# Patient Record
Sex: MALE | Race: WHITE | NOT HISPANIC OR LATINO | Employment: FULL TIME | URBAN - METROPOLITAN AREA
[De-identification: names, ages, dates, MRNs, and addresses within clinical notes are randomized per-mention and may not be internally consistent; named-entity substitution may affect disease eponyms.]

---

## 2021-08-09 PROCEDURE — 99283 EMERGENCY DEPT VISIT LOW MDM: CPT

## 2021-08-10 ENCOUNTER — APPOINTMENT (OUTPATIENT)
Dept: GENERAL RADIOLOGY | Facility: HOSPITAL | Age: 31
End: 2021-08-10

## 2021-08-10 ENCOUNTER — HOSPITAL ENCOUNTER (EMERGENCY)
Facility: HOSPITAL | Age: 31
Discharge: HOME | End: 2021-08-10
Admitting: EMERGENCY MEDICINE

## 2021-08-10 VITALS
SYSTOLIC BLOOD PRESSURE: 146 MMHG | TEMPERATURE: 98.6 F | WEIGHT: 170 LBS | DIASTOLIC BLOOD PRESSURE: 78 MMHG | OXYGEN SATURATION: 98 % | HEIGHT: 65 IN | RESPIRATION RATE: 18 BRPM | BODY MASS INDEX: 28.32 KG/M2 | HEART RATE: 88 BPM

## 2021-08-10 DIAGNOSIS — R13.19 OTHER DYSPHAGIA: Primary | ICD-10-CM

## 2021-08-10 PROCEDURE — 71045 X-RAY EXAM CHEST 1 VIEW: CPT

## 2021-08-10 RX ADMIN — LIDOCAINE HYDROCHLORIDE: 20 SOLUTION ORAL; TOPICAL at 02:01

## 2021-08-10 NOTE — DISCHARGE INSTRUCTIONS
Please take Tylenol and ibuprofen as needed for pain control.  Please call GI doctor, Dr. Gordon, above to schedule appointment later this week for possible need for scope procedure.  Please come back to the ER if you are not able to swallow liquids by mouth or spike a high fever or have significant pain.

## 2021-08-10 NOTE — ED PROVIDER NOTES
Subjective     Patient is a 31-year-old male comes in complaining of foreign body sensation and pain with swallowing for the past few days.  Patient states that he had a remote history of may be swallowing a piece of grass or rock while he was mowing the lawn but states at that time the sensation subsided.  Patient states he has been able to eat or drink since he first noticed this sensation.  Patient states it somewhat hurts to swallow but is able to do so appropriately.  Patient is able to tolerate his own secretions and is not drooling at this time.  Patient denies any fever, chills, nausea, vomiting, abdominal pain, diarrhea, chest pain or burning sensation in the chest.  Patient states his pain is about a 1 out of 10 and is minimal at best.  Patient states his sensation is about the base of his neck and states he is worried about airway compromise.  Patient denies any trouble breathing, shortness of breath or stridorous respirations.        Review of Systems   Constitutional: Negative for chills, fatigue and fever.   HENT: Positive for sore throat and trouble swallowing. Negative for congestion, dental problem, drooling, facial swelling, mouth sores, postnasal drip, rhinorrhea, sinus pressure and tinnitus.    Eyes: Negative for photophobia, discharge and visual disturbance.   Respiratory: Negative for cough and shortness of breath.    Cardiovascular: Negative for chest pain and leg swelling.   Gastrointestinal: Negative for abdominal pain, diarrhea, nausea and vomiting.   Genitourinary: Negative for dysuria, flank pain and urgency.   Musculoskeletal: Negative for arthralgias and myalgias.   Skin: Negative for rash.   Neurological: Negative for dizziness and headaches.   Psychiatric/Behavioral: Negative for confusion.       History reviewed. No pertinent past medical history.    No Known Allergies    History reviewed. No pertinent surgical history.    History reviewed. No pertinent family history.    Social  History     Socioeconomic History   • Marital status: Single     Spouse name: Not on file   • Number of children: Not on file   • Years of education: Not on file   • Highest education level: Not on file           Objective   Physical Exam  Vitals and nursing note reviewed.   Constitutional:       General: He is not in acute distress.     Appearance: He is well-developed. He is not diaphoretic.   HENT:      Head: Normocephalic and atraumatic.      Right Ear: Ear canal and external ear normal.      Left Ear: Ear canal and external ear normal.      Nose: Nose normal.      Mouth/Throat:      Mouth: Mucous membranes are moist.      Pharynx: No oropharyngeal exudate or posterior oropharyngeal erythema.      Comments: No apparent erythema of the mouth, oropharynx or exudate present.  Lymph nodes nontender.  Patient not drooling and able to swallow without issue.  Patient able to tolerate p.o. liquids appropriately.  Eyes:      Extraocular Movements: Extraocular movements intact.      Conjunctiva/sclera: Conjunctivae normal.      Pupils: Pupils are equal, round, and reactive to light.   Cardiovascular:      Rate and Rhythm: Normal rate and regular rhythm.      Heart sounds: Normal heart sounds.      Comments: S1, S2 audible.  Pulmonary:      Effort: Pulmonary effort is normal. No respiratory distress.      Breath sounds: Normal breath sounds. No wheezing, rhonchi or rales.      Comments: On room air.  Abdominal:      General: Bowel sounds are normal. There is no distension.      Palpations: Abdomen is soft.      Tenderness: There is no abdominal tenderness.   Musculoskeletal:         General: No tenderness or deformity. Normal range of motion.      Cervical back: Normal range of motion.   Skin:     General: Skin is warm.      Capillary Refill: Capillary refill takes less than 2 seconds.      Findings: No erythema or rash.   Neurological:      Mental Status: He is alert and oriented to person, place, and time.      Cranial  "Nerves: No cranial nerve deficit.   Psychiatric:         Mood and Affect: Mood normal.         Behavior: Behavior normal.         Procedures           ED Course      /78   Pulse 88   Temp 98.6 °F (37 °C) (Oral)   Resp 18   Ht 165.1 cm (65\")   Wt 77.1 kg (170 lb)   SpO2 98%   BMI 28.29 kg/m²   Labs Reviewed - No data to display  No radiology results for the last day                                       MDM  Number of Diagnoses or Management Options  Risk of Complications, Morbidity, and/or Mortality  Presenting problems: low  Diagnostic procedures: low  Management options: low    Patient Progress  Patient progress: stable    Chart review:   EKG:    Imaging: Chest x-ray unremarkable for foreign body or any acute findings.  Labs:    Vitals:  /78   Pulse 88   Temp 98.6 °F (37 °C) (Oral)   Resp 18   Ht 165.1 cm (65\")   Wt 77.1 kg (170 lb)   SpO2 98%   BMI 28.29 kg/m²     Medications given:    Medications   GI cocktail ( Oral Given 8/10/21 0201)       Procedures:    MDM: Patient is a 31-year-old male comes in complaining of difficulty swallowing and apparent foreign body sensation.  Chest x-ray was obtained and shows no foreign body or any acute findings at this time.  GI cocktail was given and patient states the foreign body sensation is about the same.  Patient was instructed to follow-up with GI, Dr. Gordon, later this week for further evaluation.  Patient is able to tolerate p.o. challenge without issue here in the ER today.  Patient is also afebrile and heart rate in the 80s and breathing appropriately.  Patient not in any acute distress upon discharge. See full discharge instructions for further details.  Results and plan discussed with patient and is agreeable with plan.    Final diagnoses:   Other dysphagia       ED Disposition  ED Disposition     ED Disposition Condition Comment    Discharge Stable           [facility] EMERGENCY DEPARTMENT  [address] " [id number] [phone]  Go in 1 day  If symptoms worsen    PATIENT CONNECTION - [facility] [address]  [phone]  Call in 1 week  As needed    Luis Gordon MD  [address] [place] [address] [phone]    Schedule an appointment as soon as possible for a visit in 3 days           Medication List      No changes were made to your prescriptions during this visit.          Hugo Moffett PA  08/10/21 0300

## 2021-09-27 ENCOUNTER — OFFICE (AMBULATORY)
Dept: URBAN - METROPOLITAN AREA CLINIC 64 | Facility: CLINIC | Age: 31
End: 2021-09-27

## 2021-09-27 VITALS
HEART RATE: 71 BPM | SYSTOLIC BLOOD PRESSURE: 115 MMHG | DIASTOLIC BLOOD PRESSURE: 75 MMHG | WEIGHT: 168 LBS | HEIGHT: 66 IN

## 2021-09-27 DIAGNOSIS — R13.10 DYSPHAGIA, UNSPECIFIED: ICD-10-CM

## 2021-09-27 PROCEDURE — 99203 OFFICE O/P NEW LOW 30 MIN: CPT | Performed by: INTERNAL MEDICINE

## 2021-10-18 ENCOUNTER — ON CAMPUS - OUTPATIENT (AMBULATORY)
Dept: URBAN - METROPOLITAN AREA HOSPITAL 2 | Facility: HOSPITAL | Age: 31
End: 2021-10-18

## 2021-10-18 ENCOUNTER — OFFICE (AMBULATORY)
Dept: URBAN - METROPOLITAN AREA PATHOLOGY 4 | Facility: PATHOLOGY | Age: 31
End: 2021-10-18

## 2021-10-18 VITALS
DIASTOLIC BLOOD PRESSURE: 69 MMHG | HEART RATE: 78 BPM | DIASTOLIC BLOOD PRESSURE: 78 MMHG | RESPIRATION RATE: 16 BRPM | HEART RATE: 87 BPM | HEART RATE: 79 BPM | RESPIRATION RATE: 15 BRPM | HEART RATE: 81 BPM | HEART RATE: 75 BPM | OXYGEN SATURATION: 100 % | SYSTOLIC BLOOD PRESSURE: 114 MMHG | HEART RATE: 85 BPM | DIASTOLIC BLOOD PRESSURE: 71 MMHG | DIASTOLIC BLOOD PRESSURE: 50 MMHG | HEIGHT: 66 IN | SYSTOLIC BLOOD PRESSURE: 125 MMHG | SYSTOLIC BLOOD PRESSURE: 121 MMHG | WEIGHT: 168 LBS | SYSTOLIC BLOOD PRESSURE: 134 MMHG | SYSTOLIC BLOOD PRESSURE: 116 MMHG | RESPIRATION RATE: 18 BRPM | DIASTOLIC BLOOD PRESSURE: 75 MMHG | SYSTOLIC BLOOD PRESSURE: 137 MMHG | DIASTOLIC BLOOD PRESSURE: 70 MMHG | TEMPERATURE: 97.9 F | OXYGEN SATURATION: 99 %

## 2021-10-18 DIAGNOSIS — R13.10 DYSPHAGIA, UNSPECIFIED: ICD-10-CM

## 2021-10-18 DIAGNOSIS — D49.0 NEOPLASM OF UNSPECIFIED BEHAVIOR OF DIGESTIVE SYSTEM: ICD-10-CM

## 2021-10-18 DIAGNOSIS — K22.70 BARRETT'S ESOPHAGUS WITHOUT DYSPLASIA: ICD-10-CM

## 2021-10-18 PROBLEM — K22.9 DISEASE OF ESOPHAGUS, UNSPECIFIED: Status: ACTIVE | Noted: 2021-10-18

## 2021-10-18 LAB
GI HISTOLOGY: A. UNSPECIFIED: (no result)
GI HISTOLOGY: PDF REPORT: (no result)

## 2021-10-18 PROCEDURE — 88305 TISSUE EXAM BY PATHOLOGIST: CPT | Performed by: INTERNAL MEDICINE

## 2021-10-18 PROCEDURE — 43450 DILATE ESOPHAGUS 1/MULT PASS: CPT | Performed by: INTERNAL MEDICINE

## 2021-10-18 PROCEDURE — 43239 EGD BIOPSY SINGLE/MULTIPLE: CPT | Performed by: INTERNAL MEDICINE

## 2021-10-18 RX ORDER — PANTOPRAZOLE 20 MG/1
20 TABLET, DELAYED RELEASE ORAL
Qty: 90 | Refills: 3 | Status: ACTIVE
Start: 2021-10-18

## 2022-10-31 ENCOUNTER — OFFICE (AMBULATORY)
Dept: URBAN - METROPOLITAN AREA CLINIC 64 | Facility: CLINIC | Age: 32
End: 2022-10-31

## 2022-10-31 VITALS
HEART RATE: 71 BPM | DIASTOLIC BLOOD PRESSURE: 82 MMHG | SYSTOLIC BLOOD PRESSURE: 131 MMHG | WEIGHT: 172 LBS | HEIGHT: 66 IN

## 2022-10-31 DIAGNOSIS — K22.70 BARRETT'S ESOPHAGUS WITHOUT DYSPLASIA: ICD-10-CM

## 2022-10-31 PROCEDURE — 99213 OFFICE O/P EST LOW 20 MIN: CPT

## 2022-10-31 RX ORDER — PANTOPRAZOLE 20 MG/1
20 TABLET, DELAYED RELEASE ORAL
Qty: 90 | Refills: 3 | Status: ACTIVE
Start: 2021-10-18

## 2022-12-14 ENCOUNTER — HOSPITAL ENCOUNTER (EMERGENCY)
Facility: HOSPITAL | Age: 32
Discharge: HOME | End: 2022-12-15
Attending: EMERGENCY MEDICINE | Admitting: EMERGENCY MEDICINE

## 2022-12-14 VITALS
SYSTOLIC BLOOD PRESSURE: 119 MMHG | OXYGEN SATURATION: 98 % | HEIGHT: 66 IN | BODY MASS INDEX: 27.16 KG/M2 | HEART RATE: 70 BPM | RESPIRATION RATE: 18 BRPM | TEMPERATURE: 97.9 F | DIASTOLIC BLOOD PRESSURE: 80 MMHG | WEIGHT: 169 LBS

## 2022-12-14 DIAGNOSIS — R53.1 GENERAL WEAKNESS: Primary | ICD-10-CM

## 2022-12-14 LAB
ANION GAP SERPL CALCULATED.3IONS-SCNC: 11 MMOL/L (ref 5–15)
BASOPHILS # BLD AUTO: 0.1 10*3/MM3 (ref 0–0.2)
BASOPHILS NFR BLD AUTO: 0.9 % (ref 0–1.5)
BUN SERPL-MCNC: 13 MG/DL (ref 6–20)
BUN/CREAT SERPL: 16 (ref 7–25)
CALCIUM SPEC-SCNC: 9.1 MG/DL (ref 8.6–10.5)
CHLORIDE SERPL-SCNC: 102 MMOL/L (ref 98–107)
CO2 SERPL-SCNC: 26 MMOL/L (ref 22–29)
CREAT SERPL-MCNC: 0.81 MG/DL (ref 0.76–1.27)
DEPRECATED RDW RBC AUTO: 41.1 FL (ref 37–54)
EGFRCR SERPLBLD CKD-EPI 2021: 118.7 ML/MIN/1.73
EOSINOPHIL # BLD AUTO: 0 10*3/MM3 (ref 0–0.4)
EOSINOPHIL NFR BLD AUTO: 0.5 % (ref 0.3–6.2)
ERYTHROCYTE [DISTWIDTH] IN BLOOD BY AUTOMATED COUNT: 13 % (ref 12.3–15.4)
FLUAV SUBTYP SPEC NAA+PROBE: NOT DETECTED
FLUBV RNA ISLT QL NAA+PROBE: NOT DETECTED
GLUCOSE SERPL-MCNC: 96 MG/DL (ref 65–99)
HCT VFR BLD AUTO: 43.7 % (ref 37.5–51)
HGB BLD-MCNC: 15.2 G/DL (ref 13–17.7)
LYMPHOCYTES # BLD AUTO: 2.7 10*3/MM3 (ref 0.7–3.1)
LYMPHOCYTES NFR BLD AUTO: 29.4 % (ref 19.6–45.3)
MCH RBC QN AUTO: 30.2 PG (ref 26.6–33)
MCHC RBC AUTO-ENTMCNC: 34.6 G/DL (ref 31.5–35.7)
MCV RBC AUTO: 87.1 FL (ref 79–97)
MONOCYTES # BLD AUTO: 0.6 10*3/MM3 (ref 0.1–0.9)
MONOCYTES NFR BLD AUTO: 6.9 % (ref 5–12)
NEUTROPHILS NFR BLD AUTO: 5.8 10*3/MM3 (ref 1.7–7)
NEUTROPHILS NFR BLD AUTO: 62.3 % (ref 42.7–76)
NRBC BLD AUTO-RTO: 0 /100 WBC (ref 0–0.2)
PLATELET # BLD AUTO: 305 10*3/MM3 (ref 140–450)
PMV BLD AUTO: 7.3 FL (ref 6–12)
POTASSIUM SERPL-SCNC: 4.1 MMOL/L (ref 3.5–5.2)
RBC # BLD AUTO: 5.02 10*6/MM3 (ref 4.14–5.8)
SARS-COV-2 RNA PNL SPEC NAA+PROBE: NOT DETECTED
SODIUM SERPL-SCNC: 139 MMOL/L (ref 136–145)
TROPONIN T SERPL-MCNC: <0.01 NG/ML (ref 0–0.03)
WBC NRBC COR # BLD: 9.3 10*3/MM3 (ref 3.4–10.8)

## 2022-12-14 PROCEDURE — 93005 ELECTROCARDIOGRAM TRACING: CPT

## 2022-12-14 PROCEDURE — 85025 COMPLETE CBC W/AUTO DIFF WBC: CPT | Performed by: EMERGENCY MEDICINE

## 2022-12-14 PROCEDURE — 87502 INFLUENZA DNA AMP PROBE: CPT | Performed by: NURSE PRACTITIONER

## 2022-12-14 PROCEDURE — C9803 HOPD COVID-19 SPEC COLLECT: HCPCS

## 2022-12-14 PROCEDURE — 80048 BASIC METABOLIC PNL TOTAL CA: CPT | Performed by: EMERGENCY MEDICINE

## 2022-12-14 PROCEDURE — 93005 ELECTROCARDIOGRAM TRACING: CPT | Performed by: EMERGENCY MEDICINE

## 2022-12-14 PROCEDURE — 87635 SARS-COV-2 COVID-19 AMP PRB: CPT | Performed by: NURSE PRACTITIONER

## 2022-12-14 PROCEDURE — 84484 ASSAY OF TROPONIN QUANT: CPT | Performed by: EMERGENCY MEDICINE

## 2022-12-14 PROCEDURE — 99284 EMERGENCY DEPT VISIT MOD MDM: CPT

## 2022-12-14 RX ORDER — IBUPROFEN 400 MG/1
800 TABLET ORAL ONCE
Status: COMPLETED | OUTPATIENT
Start: 2022-12-14 | End: 2022-12-14

## 2022-12-14 RX ADMIN — IBUPROFEN 800 MG: 400 TABLET, FILM COATED ORAL at 17:22

## 2022-12-14 NOTE — ED NOTES
Pt c/o weakness, pain in different areas of the body.  Pt was seen at the [facility] and told to come to ED.

## 2022-12-14 NOTE — ED PROVIDER NOTES
"Subjective      Provider in Triage Note  32-year-old male presents with generalized weakness and myalgias.  Denies fever sweats chills.  Denies known ill exposure.  He has had Pfizer series to completion 1 booster.  Reports he had his flu shot in October.          Review of Systems   Constitutional: Negative for fever.   HENT: Negative for congestion.    Eyes: Negative for redness.   Respiratory: Negative for cough and shortness of breath.    Cardiovascular: Negative for chest pain.   Gastrointestinal: Negative for abdominal pain, diarrhea and vomiting.   Genitourinary: Negative for dysuria.   Musculoskeletal: Positive for myalgias.   Skin: Negative for rash.   Neurological: Positive for weakness and light-headedness. Negative for headaches.   Psychiatric/Behavioral: Negative for confusion.       No past medical history on file.    No Known Allergies    No past surgical history on file.    No family history on file.    Social History     Socioeconomic History   • Marital status: Single       /77   Pulse 68   Temp 97.9 °F (36.6 °C) (Oral)   Resp 18   Ht 167.6 cm (66\")   Wt 76.7 kg (169 lb)   SpO2 98%   BMI 27.28 kg/m²       Objective   Physical Exam  Vitals and nursing note reviewed.   Constitutional:       Appearance: Normal appearance. He is well-developed.   HENT:      Head: Normocephalic and atraumatic.   Eyes:      Pupils: Pupils are equal, round, and reactive to light.   Cardiovascular:      Rate and Rhythm: Normal rate and regular rhythm.      Heart sounds: Normal heart sounds.   Pulmonary:      Effort: Pulmonary effort is normal. No respiratory distress.      Breath sounds: Normal breath sounds.   Abdominal:      General: Bowel sounds are normal.      Palpations: Abdomen is soft.      Tenderness: There is no abdominal tenderness.   Skin:     General: Skin is warm and dry.   Neurological:      General: No focal deficit present.      Mental Status: He is alert and oriented to person, place, and " time.         Procedures           ED Course      My interpretation of EKG shows sinus rhythm, rate of 89, no ST elevation     Results for orders placed or performed during the hospital encounter of 12/14/22   COVID-19,CEPHEID/RAVI,COR/BOSSMAN/PAD/SVEN/MAD IN-HOUSE(OR EMERGENT/ADD-ON),NP SWAB IN TRANSPORT MEDIA 3-4 HR TAT, RT-PCR - Swab, Nasopharynx    Specimen: Nasopharynx; Swab   Result Value Ref Range    COVID19 Not Detected Not Detected - Ref. Range   Influenza A & B, MARY - Swab, Nasopharynx    Specimen: Nasopharynx; Swab   Result Value Ref Range    Influenza A PCR Not Detected Not Detected    Influenza B PCR Not Detected Not Detected   Basic Metabolic Panel    Specimen: Blood   Result Value Ref Range    Glucose 96 65 - 99 mg/dL    BUN 13 6 - 20 mg/dL    Creatinine 0.81 0.76 - 1.27 mg/dL    Sodium 139 136 - 145 mmol/L    Potassium 4.1 3.5 - 5.2 mmol/L    Chloride 102 98 - 107 mmol/L    CO2 26.0 22.0 - 29.0 mmol/L    Calcium 9.1 8.6 - 10.5 mg/dL    BUN/Creatinine Ratio 16.0 7.0 - 25.0    Anion Gap 11.0 5.0 - 15.0 mmol/L    eGFR 118.7 >60.0 mL/min/1.73   Troponin    Specimen: Blood   Result Value Ref Range    Troponin T <0.010 0.000 - 0.030 ng/mL   CBC Auto Differential    Specimen: Blood   Result Value Ref Range    WBC 9.30 3.40 - 10.80 10*3/mm3    RBC 5.02 4.14 - 5.80 10*6/mm3    Hemoglobin 15.2 13.0 - 17.7 g/dL    Hematocrit 43.7 37.5 - 51.0 %    MCV 87.1 79.0 - 97.0 fL    MCH 30.2 26.6 - 33.0 pg    MCHC 34.6 31.5 - 35.7 g/dL    RDW 13.0 12.3 - 15.4 %    RDW-SD 41.1 37.0 - 54.0 fl    MPV 7.3 6.0 - 12.0 fL    Platelets 305 140 - 450 10*3/mm3    Neutrophil % 62.3 42.7 - 76.0 %    Lymphocyte % 29.4 19.6 - 45.3 %    Monocyte % 6.9 5.0 - 12.0 %    Eosinophil % 0.5 0.3 - 6.2 %    Basophil % 0.9 0.0 - 1.5 %    Neutrophils, Absolute 5.80 1.70 - 7.00 10*3/mm3    Lymphocytes, Absolute 2.70 0.70 - 3.10 10*3/mm3    Monocytes, Absolute 0.60 0.10 - 0.90 10*3/mm3    Eosinophils, Absolute 0.00 0.00 - 0.40 10*3/mm3    Basophils,  Absolute 0.10 0.00 - 0.20 10*3/mm3    nRBC 0.0 0.0 - 0.2 /100 WBC   ECG 12 Lead Chest Pain   Result Value Ref Range    QT Interval 349 ms                                       MDM  Patient had the above evaluation.  Results were discussed with the patient.  EKG shows no acute ischemia.  Troponin is negative.  White blood cell count is normal.  BMP is unremarkable.  Flu and COVID screens are negative.  Patient is well-appearing on exam.  He will be discharged and will follow-up with his primary doctor.      Final diagnoses:   General weakness       ED Disposition  ED Disposition     ED Disposition   Discharge    Condition   Stable    Comment   --             No follow-up provider specified.       Medication List      No changes were made to your prescriptions during this visit.          Theron Sampson MD  12/14/22 4871

## 2022-12-15 ENCOUNTER — OFFICE VISIT (OUTPATIENT)
Dept: INTERNAL MEDICINE | Facility: CLINIC | Age: 32
End: 2022-12-15

## 2022-12-15 VITALS
SYSTOLIC BLOOD PRESSURE: 124 MMHG | TEMPERATURE: 98.2 F | WEIGHT: 166.4 LBS | BODY MASS INDEX: 26.74 KG/M2 | DIASTOLIC BLOOD PRESSURE: 68 MMHG | OXYGEN SATURATION: 98 % | HEIGHT: 66 IN | HEART RATE: 72 BPM

## 2022-12-15 DIAGNOSIS — R29.898 WEAKNESS OF BOTH LEGS: Primary | ICD-10-CM

## 2022-12-15 DIAGNOSIS — Z11.59 ENCOUNTER FOR HEPATITIS C SCREENING TEST FOR LOW RISK PATIENT: ICD-10-CM

## 2022-12-15 DIAGNOSIS — R74.01 ELEVATED ALT MEASUREMENT: ICD-10-CM

## 2022-12-15 PROCEDURE — 99204 OFFICE O/P NEW MOD 45 MIN: CPT | Performed by: STUDENT IN AN ORGANIZED HEALTH CARE EDUCATION/TRAINING PROGRAM

## 2022-12-15 RX ORDER — PANTOPRAZOLE SODIUM 20 MG/1
20 TABLET, DELAYED RELEASE ORAL DAILY
COMMUNITY

## 2022-12-15 NOTE — PROGRESS NOTES
"  Elia Gorman M.D.  Internal Medicine  [facility]  [address], [address]  [place] [address]  [phone]      Chief Complaint  Establish Care and Hospital Follow Up Visit (Went to [facility] Sat a week ago for weakness. Then again last night to ER, again for weakness. They stated his BP was elevated. Needs Dr. Delaney for today. /)    SUBJECTIVE    History of Present Illness    Girish Herzog is a 32 y.o. male who presents to the office today as a new patient to establish care.     Symptoms started Saturday night. He went to [facility] on Sunday for weak knees and tingly on the left legs for a few minutes.     Yesterday he felt he had to \"take a few deep breathes\" and he felt \"weak in the knees.\" He felt like he was going to fall. He felt a mild pain all over. Pain felt like tapping in different parts of the body. He presented to the emergency department   He denies fevers, sweats, chills.  No known sick contacts.  He had EKG without acute ischemic changes.  Negative troponin.  No elevation in white blood cell count.  Normal BMP.  Negative flu and COVID. No inciting events.    Dr. Gordon is his gastroenterologist.     Social-works at [employer]. Likes Music, TV and video games.     Review of Systems    No Known Allergies     Outpatient Medications Marked as Taking for the 12/15/22 encounter (Office Visit) with Elia Gorman MD   Medication Sig Dispense Refill   • pantoprazole (PROTONIX) 20 MG EC tablet Take 20 mg by mouth Daily.          Past Medical History:   Diagnosis Date   • GERD (gastroesophageal reflux disease)      Past Surgical History:   Procedure Laterality Date   • ENDOSCOPY       Family History   Problem Relation Age of Onset   • Diabetes Father     reports that he has never smoked. He does not have any smokeless tobacco history on file. He reports that he does not currently use alcohol. He reports that he does not use drugs.    OBJECTIVE    Vital Signs:   /68   Pulse " "72   Temp 98.2 °F (36.8 °C)   Ht 167.6 cm (65.98\")   Wt 75.5 kg (166 lb 6.4 oz)   SpO2 98%   BMI 26.87 kg/m²     Physical Exam  Constitutional:       Appearance: Normal appearance. He is normal weight.   Cardiovascular:      Rate and Rhythm: Normal rate and regular rhythm.      Heart sounds: Normal heart sounds. No murmur heard.  Pulmonary:      Effort: Pulmonary effort is normal.      Breath sounds: Normal breath sounds.   Abdominal:      General: Abdomen is flat. There is no distension.      Palpations: Abdomen is soft.      Tenderness: There is no abdominal tenderness.   Skin:     General: Skin is warm and dry.   Neurological:      General: No focal deficit present.      Mental Status: He is alert.      Cranial Nerves: No cranial nerve deficit.      Sensory: No sensory deficit.      Motor: No weakness or tremor.      Coordination: Coordination normal.      Deep Tendon Reflexes: Reflexes normal.   Psychiatric:         Mood and Affect: Mood normal.         Behavior: Behavior normal.         Thought Content: Thought content normal.            The following data was reviewed by: Elia Gorman MD on 12/15/2022:  CMP    CMP 12/14/22   Glucose 96   BUN 13   Creatinine 0.81   Sodium 139   Potassium 4.1   Chloride 102   Calcium 9.1           CBC w/diff    CBC w/Diff 12/14/22   WBC 9.30   RBC 5.02   Hemoglobin 15.2   Hematocrit 43.7   MCV 87.1   MCH 30.2   MCHC 34.6   RDW 13.0   Platelets 305   Neutrophil Rel % 62.3   Lymphocyte Rel % 29.4   Monocyte Rel % 6.9   Eosinophil Rel % 0.5   Basophil Rel % 0.9                       Data reviewed: Recent emergency department and urgent care note from the [facility].         ALT was 48 and AST was 25, Bili 0.6 and AP was 78 at [facility].    ASSESSMENT & PLAN     Diagnoses and all orders for this visit:    1. Weakness of both legs (Primary)  -     MRI Brain With & Without Contrast; Future  -     Ambulatory Referral to Neurology    2. Encounter for hepatitis C screening " test for low risk patient  -     HCV Antibody Rfx To Qnt PCR; Future    3. Elevated ALT measurement  -     Comprehensive Metabolic Panel; Future      Patient with emergency department visit yesterday for transient leg weakness  with generalized pain.  Patient had normal emergency department work-up.  I am not sure what this episode was.  His examination is normal today.  Would like to work-up neurologic causes with imaging and will refer to neurology for this.  Patient had very mildly elevated [facility].  We will recheck that.    He follows with GI for GERD.    Health Maintenance Due   Topic Date Due   • COVID-19 Vaccine (4 - Booster for Pfizer series) 01/30/2022   • HEPATITIS C SCREENING  Never done        Follow Up  Return in about 1 year (around 12/15/2023) for Annual physical.    Patient/family had no further questions at this time and verbalized understanding of the plan discussed today.

## 2022-12-16 ENCOUNTER — TELEPHONE (OUTPATIENT)
Dept: INTERNAL MEDICINE | Facility: CLINIC | Age: 32
End: 2022-12-16

## 2022-12-16 LAB — QT INTERVAL: 349 MS

## 2022-12-16 NOTE — TELEPHONE ENCOUNTER
Pt calling stating that last night he was experiencing tingling in both arms while just walking around-pt states there were no other symptoms-tingling began around 11pm and only lasted about an hour but he is concerned- Please advise        Best call back number is [phone]

## 2022-12-16 NOTE — TELEPHONE ENCOUNTER
I called Girish Herzog at [phone] at 14:20 EST and he did not  phone.    I called patient back and left message that if he cannot feel his arms for prolonged periods or cannot move arms or having other new neurologic symptoms these are reasons to go to emergency department.  If they his arms are just tingling and feeling funny and this goes away on its own then this is something that we can monitor.  I left our office number in case he has questions.

## 2023-01-18 ENCOUNTER — LAB (OUTPATIENT)
Dept: INTERNAL MEDICINE | Facility: CLINIC | Age: 33
End: 2023-01-18
Payer: COMMERCIAL

## 2023-01-18 DIAGNOSIS — R74.01 ELEVATED ALT MEASUREMENT: ICD-10-CM

## 2023-01-18 DIAGNOSIS — R74.01 ELEVATED ALT MEASUREMENT: Primary | ICD-10-CM

## 2023-01-18 LAB
ALBUMIN SERPL-MCNC: 4.8 G/DL (ref 3.5–5.2)
ALBUMIN/GLOB SERPL: 2.5 G/DL
ALP SERPL-CCNC: 73 U/L (ref 39–117)
ALT SERPL-CCNC: 44 U/L (ref 1–41)
AST SERPL-CCNC: 19 U/L (ref 1–40)
BILIRUB SERPL-MCNC: 0.7 MG/DL (ref 0–1.2)
BUN SERPL-MCNC: 9 MG/DL (ref 6–20)
BUN/CREAT SERPL: 11 (ref 7–25)
CALCIUM SERPL-MCNC: 9.9 MG/DL (ref 8.6–10.5)
CHLORIDE SERPL-SCNC: 105 MMOL/L (ref 98–107)
CO2 SERPL-SCNC: 28.4 MMOL/L (ref 22–29)
CREAT SERPL-MCNC: 0.82 MG/DL (ref 0.76–1.27)
EGFRCR SERPLBLD CKD-EPI 2021: 118.2 ML/MIN/1.73
GLOBULIN SER CALC-MCNC: 1.9 GM/DL
GLUCOSE SERPL-MCNC: 108 MG/DL (ref 65–99)
POTASSIUM SERPL-SCNC: 4.2 MMOL/L (ref 3.5–5.2)
PROT SERPL-MCNC: 6.7 G/DL (ref 6–8.5)
SODIUM SERPL-SCNC: 142 MMOL/L (ref 136–145)

## 2023-01-25 LAB
FERRITIN SERPL-MCNC: 216 NG/ML (ref 30–400)
HBV SURFACE AG SERPL QL IA: NEGATIVE
HCV AB S/CO SERPL IA: <0.1 S/CO RATIO (ref 0–0.9)
HCV AB SERPL QL IA: NORMAL
IRON SATN MFR SERPL: 43 % (ref 20–50)
IRON SERPL-MCNC: 151 MCG/DL (ref 59–158)
Lab: NORMAL
TIBC SERPL-MCNC: 355 MCG/DL
UIBC SERPL-MCNC: 204 MCG/DL (ref 112–346)
WRITTEN AUTHORIZATION: NORMAL

## 2023-01-28 ENCOUNTER — HOSPITAL ENCOUNTER (OUTPATIENT)
Dept: MRI IMAGING | Facility: HOSPITAL | Age: 33
Discharge: HOME | End: 2023-01-28
Admitting: STUDENT IN AN ORGANIZED HEALTH CARE EDUCATION/TRAINING PROGRAM
Payer: COMMERCIAL

## 2023-01-28 DIAGNOSIS — R29.898 WEAKNESS OF BOTH LEGS: ICD-10-CM

## 2023-01-28 PROCEDURE — A9577 INJ MULTIHANCE: HCPCS | Performed by: STUDENT IN AN ORGANIZED HEALTH CARE EDUCATION/TRAINING PROGRAM

## 2023-01-28 PROCEDURE — 70553 MRI BRAIN STEM W/O & W/DYE: CPT

## 2023-01-28 PROCEDURE — 0 GADOBENATE DIMEGLUMINE 529 MG/ML SOLUTION: Performed by: STUDENT IN AN ORGANIZED HEALTH CARE EDUCATION/TRAINING PROGRAM

## 2023-01-28 RX ADMIN — GADOBENATE DIMEGLUMINE 15 ML: 529 INJECTION, SOLUTION INTRAVENOUS at 10:05

## 2023-01-31 ENCOUNTER — TELEPHONE (OUTPATIENT)
Dept: INTERNAL MEDICINE | Facility: CLINIC | Age: 33
End: 2023-01-31
Payer: COMMERCIAL

## 2023-01-31 DIAGNOSIS — R22.0 SCALP MASS: Primary | ICD-10-CM

## 2023-01-31 NOTE — TELEPHONE ENCOUNTER
I called Girish Herzog at [phone] at 08:43 EST.  There was no answer.    I left a voicemail stating that there was not any obvious cause of his neurologic symptoms on MRI however there was a scalp lesion that could be a hemangioma versus malignant infiltrative lesion.  I told patient that he should get a follow-up MRI in 3 months for stability.

## 2023-02-21 ENCOUNTER — OFFICE VISIT (OUTPATIENT)
Dept: INTERNAL MEDICINE | Facility: CLINIC | Age: 33
End: 2023-02-21
Payer: COMMERCIAL

## 2023-02-21 VITALS
BODY MASS INDEX: 26.41 KG/M2 | OXYGEN SATURATION: 97 % | HEART RATE: 81 BPM | HEIGHT: 66 IN | DIASTOLIC BLOOD PRESSURE: 82 MMHG | SYSTOLIC BLOOD PRESSURE: 122 MMHG | WEIGHT: 164.3 LBS

## 2023-02-21 DIAGNOSIS — R20.2 PARESTHESIAS: Primary | ICD-10-CM

## 2023-02-21 PROCEDURE — 99213 OFFICE O/P EST LOW 20 MIN: CPT | Performed by: STUDENT IN AN ORGANIZED HEALTH CARE EDUCATION/TRAINING PROGRAM

## 2023-02-21 NOTE — PROGRESS NOTES
Elia Gorman M.D.  Internal Medicine  [facility]  [address], [address]  [place] [address]  [phone]      Chief Complaint  Blurred Vision (Vision blurred and foggy he took a deep breath and was fine /) and weak in knees     SUBJECTIVE    History of Present Illness    Girish Herzog is a 32 y.o. male who presents to the office today as an established patient that last saw me on 12/15/2022.     Paresthesias happened twice since last visit. Feels tingling on face. Feels this may be related to stress but can also happen randomly.     The evening of 2/14 patient felt weak in thighs. Eye sight went fuzzy or dim which went away when he took a deep breath. Vision changes only happened for a split second. No pain in chest . Just tingling in fingers, and thighs and feet. Before it happened he had feeling back top teeth. Just came in from outside getting ready to go on break. Lasted less than 30 minutes. He felt weak in calves. Sometimes he gets random pain in left chest and elbow. He notices this happens more in evening or morning. No lightheaded/dizzziness.     Rainey's esophagus-he is getting repeat EGD in 2025. He has not had chest pain since day he went to ED (pain felt like getting poked continuously). He does get reflux.     1.4 x 0.6 cm indeterminate lesion within the left lateral frontal scalp  that involves the left temporalis muscle recommend a follow-up MRI in approximately 3 month interval to ensure stability. He has not noticed any bumps. States he has headaches.     Review of Systems    No Known Allergies     Outpatient Medications Marked as Taking for the 2/21/23 encounter (Office Visit) with Elia Gorman MD   Medication Sig Dispense Refill   • pantoprazole (PROTONIX) 20 MG EC tablet Take 20 mg by mouth Daily.          Past Medical History:   Diagnosis Date   • GERD (gastroesophageal reflux disease)      Past Surgical History:   Procedure Laterality Date   • ENDOSCOPY    "    Family History   Problem Relation Age of Onset   • Diabetes Father     reports that he has never smoked. He does not have any smokeless tobacco history on file. He reports that he does not currently use alcohol. He reports that he does not use drugs.    OBJECTIVE    Vital Signs:   /82   Pulse 81   Ht 167.6 cm (65.98\")   Wt 74.5 kg (164 lb 4.8 oz)   SpO2 97%   BMI 26.53 kg/m²     Physical Exam  Constitutional:       Appearance: Normal appearance. He is normal weight.   HENT:      Head: Normocephalic and atraumatic.   Cardiovascular:      Rate and Rhythm: Normal rate and regular rhythm.      Heart sounds: Normal heart sounds. No murmur heard.  Pulmonary:      Effort: Pulmonary effort is normal.      Breath sounds: Normal breath sounds.   Skin:     General: Skin is warm and dry.   Neurological:      General: No focal deficit present.      Mental Status: He is alert.      Cranial Nerves: No cranial nerve deficit.      Sensory: No sensory deficit.   Psychiatric:         Mood and Affect: Mood normal.         Behavior: Behavior normal.         Thought Content: Thought content normal.     I am unable to palpate any scalp mass.      The following data was reviewed by: Elia Gorman MD on 02/21/2023:  Common labs    Common Labs 12/14/22 12/14/22 1/18/23    2105 2105    Glucose  96 108 (A)   BUN  13 9   Creatinine  0.81 0.82   Sodium  139 142   Potassium  4.1 4.2   Chloride  102 105   Calcium  9.1 9.9   Total Protein   6.7   Albumin   4.8   Total Bilirubin   0.7   Alkaline Phosphatase   73   AST (SGOT)   19   ALT (SGPT)   44 (A)   WBC 9.30     Hemoglobin 15.2     Hematocrit 43.7     Platelets 305     (A) Abnormal value            Data reviewed: Recent labs and MRI             ASSESSMENT & PLAN     Diagnoses and all orders for this visit:    1. Paresthesias (Primary)  -     TSH Rfx On Abnormal To Free T4  -     Vitamin B12      32-year-old with PMH of GERD and Rainey's esophagus here today for episodes of " paresthesias.  MRI was significant for a 1.4 x 0.6 cm lesion in the left frontal scalp.  We will check B12 and TSH hyperparesthesias work-up today.  Neurology consult pending.  If not neurologic in origin this could be panic or vaso-vagal episode.  Encouraged patient to go to the emergency department for strokelike symptoms.      Health Maintenance Due   Topic Date Due   • COVID-19 Vaccine (4 - Booster for Pfizer series) 01/30/2022   • ANNUAL PHYSICAL  Never done        Follow Up  No follow-ups on file.    Patient/family had no further questions at this time and verbalized understanding of the plan discussed today.

## 2023-02-22 LAB
FERRITIN SERPL-MCNC: 234 NG/ML (ref 30–400)
HBV SURFACE AG SERPL QL IA: NEGATIVE
HCV AB SERPL QL IA: NORMAL
HCV IGG SERPL QL IA: NON REACTIVE
IRON SATN MFR SERPL: 30 % (ref 20–50)
IRON SERPL-MCNC: 101 MCG/DL (ref 59–158)
TIBC SERPL-MCNC: 341 MCG/DL
UIBC SERPL-MCNC: 240 MCG/DL (ref 112–346)

## 2023-03-02 ENCOUNTER — TELEPHONE (OUTPATIENT)
Dept: NEUROLOGY | Facility: CLINIC | Age: 33
End: 2023-03-02

## 2023-03-02 ENCOUNTER — OFFICE VISIT (OUTPATIENT)
Dept: NEUROLOGY | Facility: CLINIC | Age: 33
End: 2023-03-02
Payer: COMMERCIAL

## 2023-03-02 ENCOUNTER — LAB (OUTPATIENT)
Dept: LAB | Facility: HOSPITAL | Age: 33
End: 2023-03-02
Payer: COMMERCIAL

## 2023-03-02 VITALS
WEIGHT: 167 LBS | SYSTOLIC BLOOD PRESSURE: 142 MMHG | BODY MASS INDEX: 26.84 KG/M2 | HEIGHT: 66 IN | OXYGEN SATURATION: 98 % | DIASTOLIC BLOOD PRESSURE: 88 MMHG | HEART RATE: 86 BPM

## 2023-03-02 DIAGNOSIS — R40.4 NONSPECIFIC PAROXYSMAL SPELL: Primary | ICD-10-CM

## 2023-03-02 DIAGNOSIS — R40.4 NONSPECIFIC PAROXYSMAL SPELL: ICD-10-CM

## 2023-03-02 LAB
FOLATE SERPL-MCNC: 7.59 NG/ML (ref 4.78–24.2)
TSH SERPL DL<=0.05 MIU/L-ACNC: 0.94 UIU/ML (ref 0.27–4.2)
VIT B12 BLD-MCNC: 333 PG/ML (ref 211–946)

## 2023-03-02 PROCEDURE — 84443 ASSAY THYROID STIM HORMONE: CPT | Performed by: PSYCHIATRY & NEUROLOGY

## 2023-03-02 PROCEDURE — 82607 VITAMIN B-12: CPT

## 2023-03-02 PROCEDURE — 82746 ASSAY OF FOLIC ACID SERUM: CPT

## 2023-03-02 PROCEDURE — 36415 COLL VENOUS BLD VENIPUNCTURE: CPT

## 2023-03-02 PROCEDURE — 99204 OFFICE O/P NEW MOD 45 MIN: CPT | Performed by: PSYCHIATRY & NEUROLOGY

## 2023-03-02 NOTE — ASSESSMENT & PLAN NOTE
32 year old right handed man with spells.  He reports that around 12/10/2022 he was at work outside and he felt the need to take a deep breath and felt like there was a weakness in the knees (he did not think he was actually going to fall but had a sensation like he was week in his knees) which lasted less than 30 minutes.  The second time this spell happened was on 12/14/2022 and the same thing he felt like he needed to take a breath and had random pains in his arms and legs and chest along with the same weakness sensation that lasted around the same time and resolved.  With the second spell he was evaluated in the ED.  He had lab work that looked normal.  He had another episodes on 1/19/2023 which did not last as long.  He had another one on the 14th of February.  He thinks his last spell was on 2/17/2023.  All of the spells are similar to each other and appear stereotypical.  Of note he had a brain MRI scan on 1/28/2023 which I reviewed the images independently on his visit today and does not demonstrate any acute intracranial abnormalities but does demonstrate hyperintensity in the left frontal/temporal musculature for which a repeat brain MRI scan was recommended in 3 months to be sure the findings are stable and he tells me today that he has this scheduled for 4/18/2023.  He does not experience LOC with these spells.  He is aware that they are going on.  He denies history of migraines but tells me he has the occasional headaches that are not severe or debilitating without any light or sound sensitivity or nausea.  He reports a normal birth history and no seizures that he is aware of in childhood.  He is not sure about any family history of seizures.  He has not gotten weak to the point of falling and he tells me today he is not really weak he just feels the sensation of possible weakness.  His examination is normal today.  I will order an EEG to be sure these spells are not focal seizures without impairment of  consciousness as they appear to be stereotypical.  I will also check TSH to be sure he does not have thyroid disease and also check Vit B12/folate as well to be sure this is not contributing to his symptoms.  I will follow up with him following the testing.

## 2023-03-02 NOTE — TELEPHONE ENCOUNTER
----- Message from Nakita Bush MD sent at 3/2/2023 11:09 AM EST -----  Normal low would recommend oral supplementation.

## 2023-03-02 NOTE — PROGRESS NOTES
Chief Complaint  Extremity Weakness (Pt referred by PCP for Weakness in Lower extremities. Pt was at work in December 2022. He said he felt he needed to take a deep breaths and felt very weak in legs and knees. This happened again at work a week later and he experienced pain in arm and chest. Pt was taken to the ER during this episode. )    Subjective          Girish Herzog presents to [facility] NEUROLOGY for   HISTORY OF PRESENT ILLNESS:    Girish Herzog is a 32 year old right handed man who presents to neurology clinic for initial evaluation and treatment of a spells.  He reports that around 12/10/2022 he was at work outside and he felt the need to take a deep breath and felt like there was a weakness in the knees (he did not think he was actually going to fall but had a sensation like he was week in his knees) which lasted less than 30 minutes.  The second time this spell happened was on 12/14/2022 and the same thing he felt like he needed to take a breath and had random pains in his arms and legs and chest along with the same weakness sensation that lasted around the same time and resolved.  With the second spell he was evaluated in the ED.  He had lab work that looked normal.  He had another episodes on 1/19/2023 which did not last as long.  He had another one on the 14th of February.  He thinks his last spell was on 2/17/2023.  All of the spells are similar to each other and appear stereotypical.  Of note he had a brain MRI scan on 1/28/2023 which I reviewed the images independently on his visit today and does not demonstrate any acute intracranial abnormalities but does demonstrate hyperintensity in the left frontal/temporal musculature for which a repeat brain MRI scan was recommended in 3 months to be sure the findings are stable and he tells me today that he has this scheduled for 4/18/2023.  He denies history of migraines but tells me he has the occasional headaches that are not  "severe or debilitating without any light or sound sensitivity or nausea.  He reports a normal birth history and no seizures that he is aware of in childhood.  He is not sure about any family history of seizures.  He has not gotten weak to the point of falling and he tells me today he is not really weak he just feels the sensation of possible weakness.  He has not noticed any association of these spells with eating or not eating.  He denies feeling sleepy during these spells.  He also denies any increased anxiety or stress in his personal life when these spells started.      Past Medical History:   Diagnosis Date   • GERD (gastroesophageal reflux disease)         Family History   Problem Relation Age of Onset   • Diabetes Father         Social History     Socioeconomic History   • Marital status: Single   Tobacco Use   • Smoking status: Never   Substance and Sexual Activity   • Alcohol use: Not Currently     Comment: Social drinker   • Drug use: Never   • Sexual activity: Never        I have reviewed and confirmed the accuracy of the ROS as documented by the MA/LPN/RN Nakita Bush MD    Review of Systems   Eyes: Negative for blurred vision, double vision and photophobia.   Musculoskeletal: Negative for back pain, gait problem and neck pain.   Neurological: Positive for weakness (leg) and numbness (tingling in arm and face). Negative for dizziness, tremors, seizures, syncope, facial asymmetry, speech difficulty, light-headedness, headache, memory problem and confusion.   Psychiatric/Behavioral: Negative for agitation, behavioral problems, decreased concentration, dysphoric mood, hallucinations, self-injury, sleep disturbance, suicidal ideas, negative for hyperactivity, depressed mood and stress. The patient is not nervous/anxious.    (the weakness in the leg and numbness and tingling in arm and face are only during the spells not currently)    Objective   Vital Signs:   /88   Pulse 86   Ht 167.6 cm (65.98\") "   Wt 75.8 kg (167 lb)   SpO2 98%   BMI 26.97 kg/m²       PHYSICAL EXAM:    General   Mental Status - Alert. General Appearance - Well developed, Well groomed, Oriented and Cooperative. Orientation - Oriented X3.       Head and Neck  Head - normocephalic, atraumatic with no lesions or palpable masses.  Neck    Global Assessment - supple.       Eye   Sclera/Conjunctiva - Bilateral - Normal.    ENMT  Mouth and Throat   Oral Cavity/Oropharynx: Oropharynx - the soft palate,uvula and tongue are normal in appearance.    Chest and Lung Exam   Chest - lung clear to auscultation bilaterally.    Cardiovascular   Cardiovascular examination reveals  - normal heart sounds, regular rate and rhythm.    Neurologic   Mental Status: Speech - Normal. Cognitive function - appropriate fund of knowledge. No impairment of attention, Impairment of concentration, impairment of long term memory or impairment of short term memory.  Cranial Nerves:   II Optic: Visual acuity - Left - Normal. Right - Normal. Visual fields - Normal (to confrontation).  III Oculomotor: Pupillary constriction - Left - Normal. Right - Normal.  VII Facial: - Normal Bilaterally.   IX Glossopharyngeal / X Vagus - Normal.  XI Accessory: Trapezius - Bilateral - Normal. Sternocleidomastoid - Bilateral - Normal.  XII Hypoglossal - Bilateral - Normal.  Eye Movements: - Normal Bilaterally.  Sensory:   Light Touch: Intact - Globally.  Motor:   Bulk and Contour: - Normal.  Tone: - Normal.  Tremor: Not present.  Strength: 5/5 normal muscle strength - All Muscles.   General Assessment of Reflexes: - deep tendon reflexes are normal. Coordination - No Impairment of finger-to-nose or Impairment of rapid alternating movements. Gait - Normal.     Result Review :                 Assessment and Plan    Problem List Items Addressed This Visit        Neuro    Nonspecific paroxysmal spell - Primary    Current Assessment & Plan     32 year old right handed man with spells.  He reports  that around 12/10/2022 he was at work outside and he felt the need to take a deep breath and felt like there was a weakness in the knees (he did not think he was actually going to fall but had a sensation like he was week in his knees) which lasted less than 30 minutes.  The second time this spell happened was on 12/14/2022 and the same thing he felt like he needed to take a breath and had random pains in his arms and legs and chest along with the same weakness sensation that lasted around the same time and resolved.  With the second spell he was evaluated in the ED.  He had lab work that looked normal.  He had another episodes on 1/19/2023 which did not last as long.  He had another one on the 14th of February.  He thinks his last spell was on 2/17/2023.  All of the spells are similar to each other and appear stereotypical.  Of note he had a brain MRI scan on 1/28/2023 which I reviewed the images independently on his visit today and does not demonstrate any acute intracranial abnormalities but does demonstrate hyperintensity in the left frontal/temporal musculature for which a repeat brain MRI scan was recommended in 3 months to be sure the findings are stable and he tells me today that he has this scheduled for 4/18/2023.  He does not experience LOC with these spells.  He is aware that they are going on.  He denies history of migraines but tells me he has the occasional headaches that are not severe or debilitating without any light or sound sensitivity or nausea.  He reports a normal birth history and no seizures that he is aware of in childhood.  He is not sure about any family history of seizures.  He has not gotten weak to the point of falling and he tells me today he is not really weak he just feels the sensation of possible weakness.  His examination is normal today.  I will order an EEG to be sure these spells are not focal seizures without impairment of consciousness as they appear to be stereotypical.  I  will also check TSH to be sure he does not have thyroid disease and also check Vit B12/folate as well to be sure this is not contributing to his symptoms.  I will follow up with him following the testing.          Relevant Orders    EEG    TSH Rfx On Abnormal To Free T4    Vitamin B12    Folate     I spent 48 minutes caring for Girish on this date of service. This time includes time spent by me in the following activities:preparing for the visit, reviewing tests, obtaining and/or reviewing a separately obtained history, performing a medically appropriate examination and/or evaluation , counseling and educating the patient/family/caregiver, ordering medications, tests, or procedures, documenting information in the medical record, independently interpreting results and communicating that information with the patient/family/caregiver and care coordination    Follow Up   No follow-ups on file.  Patient was given instructions and counseling regarding his condition or for health maintenance advice. Please see specific information pulled into the AVS if appropriate.

## 2023-03-03 ENCOUNTER — TELEPHONE (OUTPATIENT)
Dept: NEUROLOGY | Facility: CLINIC | Age: 33
End: 2023-03-03
Payer: COMMERCIAL

## 2023-03-10 ENCOUNTER — PATIENT ROUNDING (BHMG ONLY) (OUTPATIENT)
Dept: NEUROLOGY | Facility: CLINIC | Age: 33
End: 2023-03-10
Payer: COMMERCIAL

## 2023-04-18 ENCOUNTER — HOSPITAL ENCOUNTER (OUTPATIENT)
Dept: MRI IMAGING | Facility: HOSPITAL | Age: 33
Discharge: HOME | End: 2023-04-18
Admitting: STUDENT IN AN ORGANIZED HEALTH CARE EDUCATION/TRAINING PROGRAM
Payer: COMMERCIAL

## 2023-04-18 DIAGNOSIS — R22.0 SCALP MASS: ICD-10-CM

## 2023-04-18 PROCEDURE — 70553 MRI BRAIN STEM W/O & W/DYE: CPT

## 2023-04-18 PROCEDURE — A9577 INJ MULTIHANCE: HCPCS | Performed by: STUDENT IN AN ORGANIZED HEALTH CARE EDUCATION/TRAINING PROGRAM

## 2023-04-18 PROCEDURE — 0 GADOBENATE DIMEGLUMINE 529 MG/ML SOLUTION: Performed by: STUDENT IN AN ORGANIZED HEALTH CARE EDUCATION/TRAINING PROGRAM

## 2023-04-18 RX ADMIN — GADOBENATE DIMEGLUMINE 16 ML: 529 INJECTION, SOLUTION INTRAVENOUS at 11:50

## 2023-04-20 DIAGNOSIS — R93.0 ABNORMAL MRI OF HEAD: Primary | ICD-10-CM

## 2023-04-28 ENCOUNTER — HOSPITAL ENCOUNTER (OUTPATIENT)
Dept: SLEEP MEDICINE | Facility: HOSPITAL | Age: 33
Discharge: HOME | End: 2023-04-28
Payer: COMMERCIAL

## 2023-04-28 DIAGNOSIS — R40.4 NONSPECIFIC PAROXYSMAL SPELL: ICD-10-CM

## 2023-04-28 PROCEDURE — 95813 EEG EXTND MNTR 61-119 MIN: CPT | Performed by: PSYCHIATRY & NEUROLOGY

## 2023-04-28 PROCEDURE — 95813 EEG EXTND MNTR 61-119 MIN: CPT

## 2023-07-25 ENCOUNTER — TELEPHONE (OUTPATIENT)
Dept: INTERNAL MEDICINE | Facility: CLINIC | Age: 33
End: 2023-07-25
Payer: COMMERCIAL

## 2023-07-25 NOTE — TELEPHONE ENCOUNTER
----- Message from Ramona Ramirez MD sent at 7/25/2023 12:50 PM EDT -----  Normal lab testing. Patient should f/u w/ Dr. Gorman if having continued leg symtpoms.  jw

## 2023-11-28 ENCOUNTER — HOSPITAL ENCOUNTER (OUTPATIENT)
Dept: MRI IMAGING | Facility: HOSPITAL | Age: 33
Discharge: HOME | End: 2023-11-28
Admitting: STUDENT IN AN ORGANIZED HEALTH CARE EDUCATION/TRAINING PROGRAM
Payer: COMMERCIAL

## 2023-11-28 DIAGNOSIS — R93.0 ABNORMAL MRI OF HEAD: ICD-10-CM

## 2023-11-28 PROCEDURE — 70553 MRI BRAIN STEM W/O & W/DYE: CPT

## 2023-11-28 PROCEDURE — A9577 INJ MULTIHANCE: HCPCS | Performed by: STUDENT IN AN ORGANIZED HEALTH CARE EDUCATION/TRAINING PROGRAM

## 2023-11-28 PROCEDURE — 0 GADOBENATE DIMEGLUMINE 529 MG/ML SOLUTION: Performed by: STUDENT IN AN ORGANIZED HEALTH CARE EDUCATION/TRAINING PROGRAM

## 2023-11-28 RX ADMIN — GADOBENATE DIMEGLUMINE 15 ML: 529 INJECTION, SOLUTION INTRAVENOUS at 14:32

## 2023-11-29 ENCOUNTER — TELEPHONE (OUTPATIENT)
Dept: INTERNAL MEDICINE | Facility: CLINIC | Age: 33
End: 2023-11-29
Payer: COMMERCIAL

## 2023-11-29 DIAGNOSIS — R22.0 SCALP MASS: Primary | ICD-10-CM

## 2023-11-29 NOTE — TELEPHONE ENCOUNTER
I called Girish Herzog at [phone] at 17:31 EST.  There was no answer so I left voicemail with MRI results.  It appears that the lesion is stable.  Most likely a benign vascular lesion such as hemangioma.  Radiology does recommend continued follow-up with MRI.  I will place order for repeat MRI in 6 months.

## 2023-12-13 ENCOUNTER — APPOINTMENT (OUTPATIENT)
Dept: GENERAL RADIOLOGY | Facility: HOSPITAL | Age: 33
End: 2023-12-13
Payer: COMMERCIAL

## 2023-12-13 ENCOUNTER — HOSPITAL ENCOUNTER (EMERGENCY)
Facility: HOSPITAL | Age: 33
Discharge: HOME | End: 2023-12-13
Attending: EMERGENCY MEDICINE | Admitting: EMERGENCY MEDICINE
Payer: COMMERCIAL

## 2023-12-13 VITALS
DIASTOLIC BLOOD PRESSURE: 79 MMHG | BODY MASS INDEX: 26.33 KG/M2 | RESPIRATION RATE: 18 BRPM | HEIGHT: 67 IN | HEART RATE: 78 BPM | WEIGHT: 167.77 LBS | SYSTOLIC BLOOD PRESSURE: 126 MMHG | TEMPERATURE: 98 F | OXYGEN SATURATION: 96 %

## 2023-12-13 DIAGNOSIS — R07.9 CHEST PAIN, UNSPECIFIED TYPE: Primary | ICD-10-CM

## 2023-12-13 LAB
ANION GAP SERPL CALCULATED.3IONS-SCNC: 9 MMOL/L (ref 5–15)
BASOPHILS # BLD AUTO: 0.1 10*3/MM3 (ref 0–0.2)
BASOPHILS NFR BLD AUTO: 0.9 % (ref 0–1.5)
BUN SERPL-MCNC: 10 MG/DL (ref 6–20)
BUN/CREAT SERPL: 12.3 (ref 7–25)
CALCIUM SPEC-SCNC: 9.6 MG/DL (ref 8.6–10.5)
CHLORIDE SERPL-SCNC: 103 MMOL/L (ref 98–107)
CO2 SERPL-SCNC: 27 MMOL/L (ref 22–29)
CREAT SERPL-MCNC: 0.81 MG/DL (ref 0.76–1.27)
D DIMER PPP FEU-MCNC: <0.19 MG/L (FEU) (ref 0–0.5)
DEPRECATED RDW RBC AUTO: 42 FL (ref 37–54)
EGFRCR SERPLBLD CKD-EPI 2021: 117.9 ML/MIN/1.73
EOSINOPHIL # BLD AUTO: 0 10*3/MM3 (ref 0–0.4)
EOSINOPHIL NFR BLD AUTO: 0.6 % (ref 0.3–6.2)
ERYTHROCYTE [DISTWIDTH] IN BLOOD BY AUTOMATED COUNT: 13.6 % (ref 12.3–15.4)
GEN 5 2HR TROPONIN T REFLEX: <6 NG/L
GLUCOSE SERPL-MCNC: 101 MG/DL (ref 65–99)
HCT VFR BLD AUTO: 47 % (ref 37.5–51)
HGB BLD-MCNC: 15.7 G/DL (ref 13–17.7)
LYMPHOCYTES # BLD AUTO: 2 10*3/MM3 (ref 0.7–3.1)
LYMPHOCYTES NFR BLD AUTO: 27 % (ref 19.6–45.3)
MCH RBC QN AUTO: 29.8 PG (ref 26.6–33)
MCHC RBC AUTO-ENTMCNC: 33.3 G/DL (ref 31.5–35.7)
MCV RBC AUTO: 89.4 FL (ref 79–97)
MONOCYTES # BLD AUTO: 0.6 10*3/MM3 (ref 0.1–0.9)
MONOCYTES NFR BLD AUTO: 8 % (ref 5–12)
NEUTROPHILS NFR BLD AUTO: 4.8 10*3/MM3 (ref 1.7–7)
NEUTROPHILS NFR BLD AUTO: 63.5 % (ref 42.7–76)
NRBC BLD AUTO-RTO: 0.1 /100 WBC (ref 0–0.2)
PLATELET # BLD AUTO: 328 10*3/MM3 (ref 140–450)
PMV BLD AUTO: 7.2 FL (ref 6–12)
POTASSIUM SERPL-SCNC: 4.3 MMOL/L (ref 3.5–5.2)
RBC # BLD AUTO: 5.25 10*6/MM3 (ref 4.14–5.8)
SODIUM SERPL-SCNC: 139 MMOL/L (ref 136–145)
TROPONIN T DELTA: NORMAL
TROPONIN T SERPL HS-MCNC: 8 NG/L
WBC NRBC COR # BLD AUTO: 7.5 10*3/MM3 (ref 3.4–10.8)

## 2023-12-13 PROCEDURE — 71045 X-RAY EXAM CHEST 1 VIEW: CPT

## 2023-12-13 PROCEDURE — 36415 COLL VENOUS BLD VENIPUNCTURE: CPT

## 2023-12-13 PROCEDURE — 85379 FIBRIN DEGRADATION QUANT: CPT | Performed by: EMERGENCY MEDICINE

## 2023-12-13 PROCEDURE — 99284 EMERGENCY DEPT VISIT MOD MDM: CPT

## 2023-12-13 PROCEDURE — 80048 BASIC METABOLIC PNL TOTAL CA: CPT | Performed by: EMERGENCY MEDICINE

## 2023-12-13 PROCEDURE — 93005 ELECTROCARDIOGRAM TRACING: CPT

## 2023-12-13 PROCEDURE — 84484 ASSAY OF TROPONIN QUANT: CPT | Performed by: EMERGENCY MEDICINE

## 2023-12-13 PROCEDURE — 85025 COMPLETE CBC W/AUTO DIFF WBC: CPT | Performed by: EMERGENCY MEDICINE

## 2023-12-13 NOTE — Clinical Note
[facility] EMERGENCY DEPARTMENT  [address] [place] [address]  Phone: [phone]    Girish Herzog was seen and treated in our emergency department on 12/13/2023.  He may return to work on 12/14/2023.         Thank you for choosing [facility].    Wil Felder MD

## 2023-12-13 NOTE — DISCHARGE INSTRUCTIONS
Follow-up with your doctor as scheduled.  Return for increasing pain persistent pain neck arm jaw pain shortness of breath dizziness passing out vomiting coughing up blood vomiting blood black or bloody stool or any other new or worsening problems or concerns return immediately to the ER.

## 2023-12-13 NOTE — ED PROVIDER NOTES
Subjective   History of Present Illness  Chief complaint chest pain    History of present illness 33-year-old male who was at work today when he developed episode of sharp stabbing pain in his chest that started about 12:30 PM he was pushing a cart he states it lasted a couple seconds and then went away it was nonradiating.  It was sharp and stabbing.  He states it happened 5 times in a row sharp stabbing fleeting went away no current pain no neck arm jaw pain shortness of breath dizziness or syncope no recent long car ride plane ride immobilization foreign travels no leg pain or swelling he did have a respiratory infection recently because he thought it might be COVID because his family members had a but he never got tested.  He has no history of heart problems there is no family history of early heart disease clotting disorders or sudden death.  He has no complaints at this time.  Patient normally does any type of physical activity without chest pain or shortness of breath.      Review of Systems   Constitutional:  Negative for chills and fever.   HENT:  Negative for congestion.    Respiratory:  Positive for chest tightness. Negative for shortness of breath.    Cardiovascular:  Positive for chest pain. Negative for palpitations.   Gastrointestinal:  Negative for abdominal pain and vomiting.   Genitourinary:  Negative for difficulty urinating and dysuria.   Musculoskeletal:  Negative for back pain and neck pain.   Neurological:  Negative for syncope and headaches.   Psychiatric/Behavioral:  Negative for confusion.        Past Medical History:   Diagnosis Date    GERD (gastroesophageal reflux disease)        No Known Allergies    Past Surgical History:   Procedure Laterality Date    ENDOSCOPY         Family History   Problem Relation Age of Onset    Diabetes Father        Social History     Socioeconomic History    Marital status: Single   Tobacco Use    Smoking status: Never   Substance and Sexual Activity     Alcohol use: Not Currently     Comment: Social drinker    Drug use: Never    Sexual activity: Never     Prior to Admission medications    Medication Sig Start Date End Date Taking? Authorizing Provider   pantoprazole (PROTONIX) 20 MG EC tablet Take 1 tablet by mouth Daily.    Provider, Manjit, MD          Objective   Physical Exam  Constitutional 33-year-old male awake alert no distress resting comfortably triage vital signs reviewed HEENT extraocular muscles are intact pupils equal round react sclera clear neck supple no adenopathy no meningeal signs no JVD no bruits lungs clear no retraction no use of accessories chest wall without tenderness or rashes heart regular without murmur both lying and sitting abdomen soft nontender good bowel sounds no peritoneal findings or enlarged liver or spleen.  Extremities pulses equal upper and lower extremities there is no edema no cords no Homans' sign no evidence of DVT skin warm and dry without rashes or cellulitic change neurologic awake alert and follows commands motor strength normal without focal weakness.  Procedures           ED Course      Results for orders placed or performed during the hospital encounter of 12/13/23   Basic Metabolic Panel    Specimen: Blood   Result Value Ref Range    Glucose 101 (H) 65 - 99 mg/dL    BUN 10 6 - 20 mg/dL    Creatinine 0.81 0.76 - 1.27 mg/dL    Sodium 139 136 - 145 mmol/L    Potassium 4.3 3.5 - 5.2 mmol/L    Chloride 103 98 - 107 mmol/L    CO2 27.0 22.0 - 29.0 mmol/L    Calcium 9.6 8.6 - 10.5 mg/dL    BUN/Creatinine Ratio 12.3 7.0 - 25.0    Anion Gap 9.0 5.0 - 15.0 mmol/L    eGFR 117.9 >60.0 mL/min/1.73   High Sensitivity Troponin T    Specimen: Blood   Result Value Ref Range    HS Troponin T 8 <22 ng/L   D-dimer, Quantitative    Specimen: Blood   Result Value Ref Range    D-Dimer, Quantitative <0.19 0.00 - 0.50 mg/L (FEU)   CBC Auto Differential    Specimen: Blood   Result Value Ref Range    WBC 7.50 3.40 - 10.80 10*3/mm3     RBC 5.25 4.14 - 5.80 10*6/mm3    Hemoglobin 15.7 13.0 - 17.7 g/dL    Hematocrit 47.0 37.5 - 51.0 %    MCV 89.4 79.0 - 97.0 fL    MCH 29.8 26.6 - 33.0 pg    MCHC 33.3 31.5 - 35.7 g/dL    RDW 13.6 12.3 - 15.4 %    RDW-SD 42.0 37.0 - 54.0 fl    MPV 7.2 6.0 - 12.0 fL    Platelets 328 140 - 450 10*3/mm3    Neutrophil % 63.5 42.7 - 76.0 %    Lymphocyte % 27.0 19.6 - 45.3 %    Monocyte % 8.0 5.0 - 12.0 %    Eosinophil % 0.6 0.3 - 6.2 %    Basophil % 0.9 0.0 - 1.5 %    Neutrophils, Absolute 4.80 1.70 - 7.00 10*3/mm3    Lymphocytes, Absolute 2.00 0.70 - 3.10 10*3/mm3    Monocytes, Absolute 0.60 0.10 - 0.90 10*3/mm3    Eosinophils, Absolute 0.00 0.00 - 0.40 10*3/mm3    Basophils, Absolute 0.10 0.00 - 0.20 10*3/mm3    nRBC 0.1 0.0 - 0.2 /100 WBC   High Sensitivity Troponin T 2Hr    Specimen: Blood   Result Value Ref Range    HS Troponin T <6 <22 ng/L    Troponin T Delta     ECG 12 Lead Chest Pain   Result Value Ref Range    QT Interval 359 ms    QTC Interval 410 ms     XR Chest 1 View    Result Date: 12/13/2023  There is no significant change when compared to the prior study. There is no evidence for acute cardiopulmonary process. Electronically Signed: Jatin Whitehead MD  12/13/2023 1:27 PM EST  Workstation ID: [id number]   Medications - No data to display                                    EKG my interpretation normal sinus rhythm rate of 78 normal axis hypertrophy QTc of 410 normal EKG and unchanged from 12/14/2022       Medical Decision Making  Medical decision making.  Monitor placed my review of sinus rhythm EKG obtained my independent review normal sinus rhythm rate 78 normal axis hypertrophy was a normal EKG and unchanged from 12/14/2022.  Chest x-ray my independent review no pneumonia pneumothorax CHF or cardiomegaly radiology unremarkable.  Labs obtained my independent reviewed basic metabolic profile unremarkable D-dimer negative CBC unremarkable 2 troponins 2 hours apart were normal..  Patient resting comfortably.   He has a heart score less than 3.  This is a fleeting sharp stabbing pain and last few seconds then goes away that is nonradiating.  He has no early family history of heart disease clotting disorders or sudden death.  I do not see evidence acute myocardial infarction DVT pulmonary embolism air dissection pericardial effusion myocarditis pericarditis sepsis bacteremia pneumonia pneumothorax.  Not complete list of all possibilities but is clinically stable we talked about the findings and what to return for and importance of follow-up he has remained stable was discharged home for outpatient management.  He had no further questions voices understanding    Problems Addressed:  Chest pain, unspecified type: complicated acute illness or injury    Amount and/or Complexity of Data Reviewed  External Data Reviewed: ECG.  Labs: ordered. Decision-making details documented in ED Course.  Radiology: ordered and independent interpretation performed. Decision-making details documented in ED Course.  ECG/medicine tests: ordered and independent interpretation performed. Decision-making details documented in ED Course.        Final diagnoses:   Chest pain, unspecified type       ED Disposition  ED Disposition       ED Disposition   Discharge    Condition   Stable    Comment   --               Elia Gorman MD  [address] [address]  [phone]    In 1 day           Medication List      No changes were made to your prescriptions during this visit.            Wil Felder MD  12/14/23 [id number]

## 2023-12-14 ENCOUNTER — HOSPITAL ENCOUNTER (EMERGENCY)
Facility: HOSPITAL | Age: 33
Discharge: HOME | End: 2023-12-14
Attending: EMERGENCY MEDICINE
Payer: COMMERCIAL

## 2023-12-14 VITALS
WEIGHT: 164.46 LBS | BODY MASS INDEX: 26.43 KG/M2 | RESPIRATION RATE: 16 BRPM | TEMPERATURE: 98.8 F | DIASTOLIC BLOOD PRESSURE: 86 MMHG | OXYGEN SATURATION: 98 % | SYSTOLIC BLOOD PRESSURE: 122 MMHG | HEART RATE: 69 BPM | HEIGHT: 66 IN

## 2023-12-14 DIAGNOSIS — R07.81 RIB PAIN ON LEFT SIDE: Primary | ICD-10-CM

## 2023-12-14 LAB
ALBUMIN SERPL-MCNC: 4.1 G/DL (ref 3.5–5.2)
ALBUMIN/GLOB SERPL: 1.4 G/DL
ALP SERPL-CCNC: 70 U/L (ref 39–117)
ALT SERPL W P-5'-P-CCNC: 45 U/L (ref 1–41)
ANION GAP SERPL CALCULATED.3IONS-SCNC: 11 MMOL/L (ref 5–15)
AST SERPL-CCNC: 24 U/L (ref 1–40)
BASOPHILS # BLD AUTO: 0.1 10*3/MM3 (ref 0–0.2)
BASOPHILS NFR BLD AUTO: 0.8 % (ref 0–1.5)
BILIRUB SERPL-MCNC: 0.5 MG/DL (ref 0–1.2)
BUN SERPL-MCNC: 13 MG/DL (ref 6–20)
BUN/CREAT SERPL: 18.6 (ref 7–25)
CALCIUM SPEC-SCNC: 9.1 MG/DL (ref 8.6–10.5)
CHLORIDE SERPL-SCNC: 101 MMOL/L (ref 98–107)
CO2 SERPL-SCNC: 27 MMOL/L (ref 22–29)
CREAT SERPL-MCNC: 0.7 MG/DL (ref 0.76–1.27)
DEPRECATED RDW RBC AUTO: 41.6 FL (ref 37–54)
EGFRCR SERPLBLD CKD-EPI 2021: 123.2 ML/MIN/1.73
EOSINOPHIL # BLD AUTO: 0.1 10*3/MM3 (ref 0–0.4)
EOSINOPHIL NFR BLD AUTO: 0.9 % (ref 0.3–6.2)
ERYTHROCYTE [DISTWIDTH] IN BLOOD BY AUTOMATED COUNT: 13.6 % (ref 12.3–15.4)
GLOBULIN UR ELPH-MCNC: 3 GM/DL
GLUCOSE SERPL-MCNC: 91 MG/DL (ref 65–99)
HCT VFR BLD AUTO: 44 % (ref 37.5–51)
HGB BLD-MCNC: 15 G/DL (ref 13–17.7)
LYMPHOCYTES # BLD AUTO: 2.6 10*3/MM3 (ref 0.7–3.1)
LYMPHOCYTES NFR BLD AUTO: 29.1 % (ref 19.6–45.3)
MCH RBC QN AUTO: 30.1 PG (ref 26.6–33)
MCHC RBC AUTO-ENTMCNC: 34.2 G/DL (ref 31.5–35.7)
MCV RBC AUTO: 88.2 FL (ref 79–97)
MONOCYTES # BLD AUTO: 0.8 10*3/MM3 (ref 0.1–0.9)
MONOCYTES NFR BLD AUTO: 8.7 % (ref 5–12)
NEUTROPHILS NFR BLD AUTO: 5.3 10*3/MM3 (ref 1.7–7)
NEUTROPHILS NFR BLD AUTO: 60.5 % (ref 42.7–76)
NRBC BLD AUTO-RTO: 0.1 /100 WBC (ref 0–0.2)
PLATELET # BLD AUTO: 309 10*3/MM3 (ref 140–450)
PMV BLD AUTO: 7 FL (ref 6–12)
POTASSIUM SERPL-SCNC: 4.2 MMOL/L (ref 3.5–5.2)
PROT SERPL-MCNC: 7.1 G/DL (ref 6–8.5)
QT INTERVAL: 359 MS
QTC INTERVAL: 410 MS
RBC # BLD AUTO: 4.99 10*6/MM3 (ref 4.14–5.8)
SODIUM SERPL-SCNC: 139 MMOL/L (ref 136–145)
TROPONIN T SERPL HS-MCNC: <6 NG/L
WBC NRBC COR # BLD AUTO: 8.8 10*3/MM3 (ref 3.4–10.8)

## 2023-12-14 PROCEDURE — 99283 EMERGENCY DEPT VISIT LOW MDM: CPT

## 2023-12-14 PROCEDURE — 80053 COMPREHEN METABOLIC PANEL: CPT | Performed by: NURSE PRACTITIONER

## 2023-12-14 PROCEDURE — 93005 ELECTROCARDIOGRAM TRACING: CPT | Performed by: EMERGENCY MEDICINE

## 2023-12-14 PROCEDURE — 84484 ASSAY OF TROPONIN QUANT: CPT | Performed by: NURSE PRACTITIONER

## 2023-12-14 PROCEDURE — 93005 ELECTROCARDIOGRAM TRACING: CPT

## 2023-12-14 PROCEDURE — 85025 COMPLETE CBC W/AUTO DIFF WBC: CPT | Performed by: NURSE PRACTITIONER

## 2023-12-14 RX ORDER — SODIUM CHLORIDE 0.9 % (FLUSH) 0.9 %
10 SYRINGE (ML) INJECTION AS NEEDED
Status: DISCONTINUED | OUTPATIENT
Start: 2023-12-14 | End: 2023-12-14 | Stop reason: HOSPADM

## 2023-12-15 LAB
QT INTERVAL: 365 MS
QTC INTERVAL: 434 MS

## 2023-12-15 NOTE — ED NOTES
Pt here for chest pain that radiated to back; pt states pain was under left breast; pt states pain has subsided now;

## 2023-12-15 NOTE — DISCHARGE INSTRUCTIONS
Follow up with PCP, call for an appointment in 1-2 days, if you do not have a primary care provider please use patient connection [phone] to help establish care  Follow up with any specialist as indicated and discussed  Return to the ED for new or worsening symptoms  Take any medications as prescribed

## 2023-12-15 NOTE — ED NOTES
Pt arrived via PV c/o pain in his left ribs that radiates to his back. Pt states he was seen yesterday for the same complaint and was told to return if the pain returned

## 2023-12-15 NOTE — ED PROVIDER NOTES
Subjective   Chief Complaint   Patient presents with    Rib Pain    Back Pain       History of Present Illness  Patient is a 33-year-old male presents the ED with complaint of left rib pain, back pain.  He reports his symptoms began at 630, he reports last time he got here to the ED they are resolved and he no longer has any pain or discomfort.  He reports having similar symptoms yesterday.  He denies any leg swelling or pain.  No episodes of dizziness, lightheadedness, diaphoresis or syncope.  No fevers  Review of Systems   Constitutional:  Negative for chills and fever.   Respiratory:  Negative for chest tightness and shortness of breath.    Cardiovascular:  Positive for chest pain. Negative for palpitations and leg swelling.   Musculoskeletal:  Positive for back pain. Negative for neck pain.   Skin:  Negative for color change and rash.   Neurological:  Negative for dizziness, syncope, weakness, light-headedness and headaches.       Past Medical History:   Diagnosis Date    GERD (gastroesophageal reflux disease)        No Known Allergies    Past Surgical History:   Procedure Laterality Date    ENDOSCOPY         Family History   Problem Relation Age of Onset    Diabetes Father        Social History     Socioeconomic History    Marital status: Single   Tobacco Use    Smoking status: Never   Substance and Sexual Activity    Alcohol use: Not Currently     Comment: Social drinker    Drug use: Never    Sexual activity: Never           Objective   Physical Exam  Vitals and nursing note reviewed.   Constitutional:       Appearance: Normal appearance. He is not toxic-appearing.   HENT:      Head: Normocephalic and atraumatic.      Nose: Nose normal.      Mouth/Throat:      Mouth: Mucous membranes are moist.      Pharynx: Oropharynx is clear.   Eyes:      Extraocular Movements: Extraocular movements intact.      Conjunctiva/sclera: Conjunctivae normal.      Pupils: Pupils are equal, round, and reactive to light.  "  Cardiovascular:      Rate and Rhythm: Normal rate and regular rhythm.      Heart sounds: Normal heart sounds. No murmur heard.     No friction rub. No gallop.   Pulmonary:      Effort: Pulmonary effort is normal.      Breath sounds: Normal breath sounds.   Abdominal:      General: Bowel sounds are normal.      Palpations: Abdomen is soft.      Tenderness: There is no abdominal tenderness. There is no guarding or rebound.   Musculoskeletal:         General: Normal range of motion.      Cervical back: Normal range of motion and neck supple.   Skin:     General: Skin is warm and dry.      Capillary Refill: Capillary refill takes less than 2 seconds.      Findings: No rash.   Neurological:      Mental Status: He is alert and oriented to person, place, and time.         Procedures           ED Course  /83 (BP Location: Left arm, Patient Position: Lying)   Pulse 79   Temp 98.8 °F (37.1 °C)   Resp 14   Ht 167.6 cm (66\")   Wt 74.6 kg (164 lb 7.4 oz)   SpO2 96%   BMI 26.55 kg/m²   Medications   sodium chloride 0.9 % flush 10 mL (has no administration in time range)     XR Chest 1 View    Result Date: 12/13/2023  There is no significant change when compared to the prior study. There is no evidence for acute cardiopulmonary process. Electronically Signed: Jatin Whitehead MD  12/13/2023 1:27 PM EST  Workstation ID: [id number]   Lab Results (last 24 hours)       Procedure Component Value Units Date/Time    CBC & Differential [[id number]]  (Normal) Collected: 12/14/23 2035    Specimen: Blood Updated: 12/14/23 2045    Narrative:      The following orders were created for panel order CBC & Differential.  Procedure                               Abnormality         Status                     ---------                               -----------         ------                     CBC Auto Differential[[id number]]        Normal              Final result                 Please view results for these tests on the individual " orders.    Comprehensive Metabolic Panel [[id number]]  (Abnormal) Collected: 12/14/23 2035    Specimen: Blood Updated: 12/14/23 2112     Glucose 91 mg/dL      BUN 13 mg/dL      Creatinine 0.70 mg/dL      Sodium 139 mmol/L      Potassium 4.2 mmol/L      Chloride 101 mmol/L      CO2 27.0 mmol/L      Calcium 9.1 mg/dL      Total Protein 7.1 g/dL      Albumin 4.1 g/dL      ALT (SGPT) 45 U/L      AST (SGOT) 24 U/L      Alkaline Phosphatase 70 U/L      Total Bilirubin 0.5 mg/dL      Globulin 3.0 gm/dL      A/G Ratio 1.4 g/dL      BUN/Creatinine Ratio 18.6     Anion Gap 11.0 mmol/L      eGFR 123.2 mL/min/1.73     Narrative:      GFR Normal >60  Chronic Kidney Disease <60  Kidney Failure <15      Single High Sensitivity Troponin T [[id number]]  (Normal) Collected: 12/14/23 2035    Specimen: Blood Updated: 12/14/23 2112     HS Troponin T <6 ng/L     Narrative:      High Sensitive Troponin T Reference Range:  <14.0 ng/L- Negative Female for AMI  <22.0 ng/L- Negative Male for AMI  >=14 - Abnormal Female indicating possible myocardial injury.  >=22 - Abnormal Male indicating possible myocardial injury.   Clinicians would have to utilize clinical acumen, EKG, Troponin, and serial changes to determine if it is an Acute Myocardial Infarction or myocardial injury due to an underlying chronic condition.         CBC Auto Differential [[id number]]  (Normal) Collected: 12/14/23 2035    Specimen: Blood Updated: 12/14/23 2045     WBC 8.80 10*3/mm3      RBC 4.99 10*6/mm3      Hemoglobin 15.0 g/dL      Hematocrit 44.0 %      MCV 88.2 fL      MCH 30.1 pg      MCHC 34.2 g/dL      RDW 13.6 %      RDW-SD 41.6 fl      MPV 7.0 fL      Platelets 309 10*3/mm3      Neutrophil % 60.5 %      Lymphocyte % 29.1 %      Monocyte % 8.7 %      Eosinophil % 0.9 %      Basophil % 0.8 %      Neutrophils, Absolute 5.30 10*3/mm3      Lymphocytes, Absolute 2.60 10*3/mm3      Monocytes, Absolute 0.80 10*3/mm3      Eosinophils, Absolute 0.10 10*3/mm3      Basophils,  Absolute 0.10 10*3/mm3      nRBC 0.1 /100 WBC                       EKG sinus rhythm rate of 85.  Compared to previous 12/13/2023.  No acute ST change noted.  Corroborated with ED attending physician.                                       Medical Decision Making  Problems Addressed:  Rib pain on left side: complicated acute illness or injury    Amount and/or Complexity of Data Reviewed  Labs: ordered.  ECG/medicine tests: ordered and independent interpretation performed. Decision-making details documented in ED Course.    Risk  Prescription drug management.    Chart Review: Reviewed patient's previous ED visit 12/13/2023.    Labs: CBC CMP and troponin reviewed.  Reviewed from yesterday.    Imaging: Patient had x-ray imaging completed yesterday.  Reviewed chest x-ray.    33-year-old male presents the ED with complaint of left rib pain, radiates around to the back.  Differential diagnoses considered for patient presentation, this list is not all inclusive of diagnoses considered: Pneumonia, STEMI, zoster.  Patient had IV established, blood work obtained, was placed on appropriate monitoring, underwent the above ED exam and evaluation, notable for, workup as above.  Patient has been pain-free since evaluation here in the ED.  Workup reassuring.  I feel patient be discharged from the ED and continue follow-up.  He reports he has been having these pains intermittently over the past year.  He has an appointment scheduled for December 20 with his provider.      Disposition: I spoke with the patient at the bedside regarding their plan of care, discharge instruction,  prescriptions, as well as reasons to return to the emergency department.  We discussed test results at the bedside, including incidental abnormal labs, radiological findings, understands need and importance  for follow-up with primary care or specialist if indicated.  Patient verbalizes understanding and agrees to the treatment plan at this time.         Note  Disclaimer: At [facility], we believe that sharing information builds trust and better relationships. You are receiving this note because you recently visited [facility]. It is possible you will see health information before a provider has talked with you about it. This kind of information can be easy to misunderstand. To help you fully understand what it means for your health, we urge you to discuss this note with your provider.Note dictated utilizing Dragon Dictation. Appropriate PPE worn during patient interactions.        Final diagnoses:   Rib pain on left side       ED Disposition  ED Disposition       ED Disposition   Discharge    Condition   Stable    Comment   --               Elia Gorman MD  [address] [id number] [phone]    Schedule an appointment as soon as possible for a visit       [facility] EMERGENCY DEPARTMENT  [facility] [facility] [address] [phone]    As needed, If symptoms worsen         Medication List      No changes were made to your prescriptions during this visit.            Selene Rios, APRN  12/14/23 0479

## 2023-12-20 ENCOUNTER — OFFICE VISIT (OUTPATIENT)
Dept: INTERNAL MEDICINE | Facility: CLINIC | Age: 33
End: 2023-12-20
Payer: COMMERCIAL

## 2023-12-20 VITALS
OXYGEN SATURATION: 96 % | WEIGHT: 168 LBS | HEIGHT: 66 IN | HEART RATE: 77 BPM | TEMPERATURE: 97.3 F | BODY MASS INDEX: 27 KG/M2 | SYSTOLIC BLOOD PRESSURE: 123 MMHG | RESPIRATION RATE: 16 BRPM | DIASTOLIC BLOOD PRESSURE: 85 MMHG

## 2023-12-20 DIAGNOSIS — R07.2 PRECORDIAL PAIN: ICD-10-CM

## 2023-12-20 DIAGNOSIS — Z00.00 ANNUAL PHYSICAL EXAM: Primary | ICD-10-CM

## 2023-12-20 LAB
CHOLEST SERPL-MCNC: 185 MG/DL (ref 0–200)
HBA1C MFR BLD: 5 % (ref 4.8–5.6)
HDLC SERPL-MCNC: 51 MG/DL (ref 40–60)
LDLC SERPL CALC-MCNC: 118 MG/DL (ref 0–100)
TRIGL SERPL-MCNC: 86 MG/DL (ref 0–150)
VLDLC SERPL CALC-MCNC: 16 MG/DL (ref 5–40)

## 2023-12-20 PROCEDURE — 99395 PREV VISIT EST AGE 18-39: CPT | Performed by: STUDENT IN AN ORGANIZED HEALTH CARE EDUCATION/TRAINING PROGRAM

## 2023-12-20 RX ORDER — VITAMIN B COMPLEX
1 TABLET ORAL DAILY
COMMUNITY

## 2023-12-20 NOTE — PROGRESS NOTES
"  Elia Gorman M.D.  Internal Medicine  [facility]  [address], [address]  [place] [address]  [phone]      Chief Complaint  Annual Exam    SUBJECTIVE    History of Present Illness    Girish Herzog is a 33 y.o. male with Rainey's esophaguswho presents to the office today as an established patient that last saw me on 2/21/2023.     Felt better taking liquid IV but now feeling more random pain and knee weakness. No falls.     He went to the emergency department on December 13 for sharp stabbing chest pain while he was pushing a cart and leg weakness.  Laboratory workup in the emergency department was unremarkable.  He had a normal chest x-ray.  He had a normal EKG which was unchanged from previous.  He went to the emergency department again on December 14 for left rib pain and back pain.  Laboratory workup was significant for slightly elevated ALT of 45.  His EKG was unchanged from previous. He is frustrated he keeps going to the ER for this issue.     Still getting intermittent sharp chest pain. Not pleuritis. Pain lasts a few seconds at a time. No inciting events. Not associated with physical activity/stress.     Has a follow up coming up with GI for Rainey's.     Feels he is thinking about thing and it loops in he heads. He is worried he will be \"the boy who cried rich\".     Walks for exercise    Review of Systems    No Known Allergies     Outpatient Medications Marked as Taking for the 12/20/23 encounter (Office Visit) with Elia Gorman MD   Medication Sig Dispense Refill    B Complex Vitamins (Vitamin-B Complex) tablet Take 1 tablet by mouth Daily.      pantoprazole (PROTONIX) 20 MG EC tablet Take 1 tablet by mouth Daily.          Past Medical History:   Diagnosis Date    GERD (gastroesophageal reflux disease)      Past Surgical History:   Procedure Laterality Date    ENDOSCOPY       Family History   Problem Relation Age of Onset    Diabetes Father     reports that he has never " "smoked. He has never used smokeless tobacco. He reports that he does not currently use alcohol. He reports that he does not use drugs.    OBJECTIVE    Vital Signs:   /85   Pulse 77   Temp 97.3 °F (36.3 °C) (Infrared)   Resp 16   Ht 167.6 cm (66\")   Wt 76.2 kg (168 lb)   SpO2 96%   BMI 27.12 kg/m²     Physical Exam  Constitutional:       Appearance: Normal appearance. He is normal weight.   Cardiovascular:      Rate and Rhythm: Normal rate and regular rhythm.      Heart sounds: Normal heart sounds. No murmur heard.  Pulmonary:      Effort: Pulmonary effort is normal.      Breath sounds: Normal breath sounds.   Abdominal:      General: Abdomen is flat. There is no distension.      Palpations: Abdomen is soft.      Tenderness: There is no abdominal tenderness.   Skin:     General: Skin is warm and dry.   Neurological:      Mental Status: He is alert.   Psychiatric:         Mood and Affect: Mood normal.         Behavior: Behavior normal.         Thought Content: Thought content normal.            The following data was reviewed by: Elia Gorman MD on 12/20/2023:  CMP          7/21/2023    09:31 12/13/2023    13:14 12/14/2023    20:35   CMP   Glucose 109  101  91    BUN 11  10  13    Creatinine 0.82  0.81  0.70    EGFR  117.9  123.2    Sodium 142  139  139    Potassium 4.8  4.3  4.2    Chloride 106  103  101    Calcium 9.3  9.6  9.1    Total Protein 6.6      Total Protein   7.1    Albumin 4.4   4.1    Globulin 2.2      Globulin   3.0    Total Bilirubin 0.5   0.5    Alkaline Phosphatase 72   70    AST (SGOT) 20   24    ALT (SGPT) 34   45    Albumin/Globulin Ratio   1.4    BUN/Creatinine Ratio 13.4  12.3  18.6    Anion Gap  9.0  11.0      CBC w/diff          12/13/2023    13:14 12/14/2023    20:35   CBC w/Diff   WBC 7.50  8.80    RBC 5.25  4.99    Hemoglobin 15.7  15.0    Hematocrit 47.0  44.0    MCV 89.4  88.2    MCH 29.8  30.1    MCHC 33.3  34.2    RDW 13.6  13.6    Platelets 328  309    Neutrophil Rel % 63.5 "  60.5    Lymphocyte Rel % 27.0  29.1    Monocyte Rel % 8.0  8.7    Eosinophil Rel % 0.6  0.9    Basophil Rel % 0.9  0.8        TSH          3/2/2023    09:50   TSH   TSH 0.938      Data reviewed : recent ED              ASSESSMENT & PLAN     Diagnoses and all orders for this visit:    1. Annual physical exam (Primary)  -     Lipid Panel  -     Hemoglobin A1c    2. Precordial pain    For pain, weakness and spasms this seems improved with liquid IV but still bothersome given recent ED visits. Recent magnesium was normal but I think taking a supplement still could help symptoms. I suspect there is a component of anxiety as well. I encouraged him to follow up with cardiology and neurology.     Annual Preventative Health Examination  -Age and sex appropriate physical exam performed and documented. Updated past medical, family, social and surgical histories as well as allergies and care team list. Addressed care gaps listed in the medical record.  -Encouraged annual dental and vision exams as part of their overall health.  -Encouraged minimum of 30 minutes or more of exercise at a brisk walk or higher 5 days per week combined with a well-balanced diet.   -Immunizations reviewed and updated in EMR. Recommended the following vaccines for the patient:  -Lipid screening:  Will screen for hyperlipidemia today and calculate ASCVD risk if appropriate.    -Aspirin for primary or secondary prevention: Not applicable, patient is less than age 50.  -Depression screening: PHQ2 performed and the patient's screen was negative.  -Diabetes screening:  Patient is 35-70 years of age and overweight, screening for diabetes is indicated every 3 years. Screening is not up to date and will be updated today.   -Abdominal aortic aneurysm screening: AAA screening ultrasound is not indicated as patient has never smoked.  -Hypertension screening: Patient screened negative for HTN today.  -Colon cancer screening: Patient is less than age 45 and colon  cancer screening is not indicated.  -Lung cancer screening: Patient has never smoked.  -Prostate cancer screening: Not applicable, patient is less than 50 years old.       Health Maintenance Due   Topic Date Due    ANNUAL PHYSICAL  Never done        Follow Up  Return in about 1 year (around 12/20/2024) for Annual physical.    Patient/family had no further questions at this time and verbalized understanding of the plan discussed today.

## 2024-02-09 ENCOUNTER — OFFICE (AMBULATORY)
Dept: URBAN - METROPOLITAN AREA CLINIC 64 | Facility: CLINIC | Age: 34
End: 2024-02-09

## 2024-02-09 VITALS
SYSTOLIC BLOOD PRESSURE: 116 MMHG | HEIGHT: 66 IN | HEART RATE: 74 BPM | WEIGHT: 170 LBS | DIASTOLIC BLOOD PRESSURE: 78 MMHG

## 2024-02-09 DIAGNOSIS — K22.70 BARRETT'S ESOPHAGUS WITHOUT DYSPLASIA: ICD-10-CM

## 2024-02-09 PROCEDURE — 99213 OFFICE O/P EST LOW 20 MIN: CPT

## 2024-02-09 RX ORDER — PANTOPRAZOLE 20 MG/1
20 TABLET, DELAYED RELEASE ORAL
Qty: 90 | Refills: 3 | Status: ACTIVE
Start: 2021-10-18

## 2024-05-07 ENCOUNTER — OFFICE VISIT (OUTPATIENT)
Dept: NEUROLOGY | Facility: CLINIC | Age: 34
End: 2024-05-07
Payer: COMMERCIAL

## 2024-05-07 VITALS
HEIGHT: 66 IN | WEIGHT: 168 LBS | OXYGEN SATURATION: 98 % | SYSTOLIC BLOOD PRESSURE: 150 MMHG | DIASTOLIC BLOOD PRESSURE: 80 MMHG | HEART RATE: 74 BPM | BODY MASS INDEX: 27 KG/M2

## 2024-05-07 DIAGNOSIS — R40.4 NONSPECIFIC PAROXYSMAL SPELL: Primary | ICD-10-CM

## 2024-05-07 PROCEDURE — 99214 OFFICE O/P EST MOD 30 MIN: CPT | Performed by: PSYCHIATRY & NEUROLOGY

## 2024-05-07 RX ORDER — MAGNESIUM OXIDE 400 MG/1
400 TABLET ORAL DAILY
COMMUNITY

## 2024-07-06 ENCOUNTER — HOSPITAL ENCOUNTER (OUTPATIENT)
Dept: MRI IMAGING | Facility: HOSPITAL | Age: 34
Discharge: HOME | End: 2024-07-06
Admitting: STUDENT IN AN ORGANIZED HEALTH CARE EDUCATION/TRAINING PROGRAM
Payer: COMMERCIAL

## 2024-07-06 DIAGNOSIS — R22.0 SCALP MASS: ICD-10-CM

## 2024-07-06 PROCEDURE — A9577 INJ MULTIHANCE: HCPCS | Performed by: STUDENT IN AN ORGANIZED HEALTH CARE EDUCATION/TRAINING PROGRAM

## 2024-07-06 PROCEDURE — 70553 MRI BRAIN STEM W/O & W/DYE: CPT

## 2024-07-06 PROCEDURE — 0 GADOBENATE DIMEGLUMINE 529 MG/ML SOLUTION: Performed by: STUDENT IN AN ORGANIZED HEALTH CARE EDUCATION/TRAINING PROGRAM

## 2024-07-06 RX ADMIN — GADOBENATE DIMEGLUMINE 15 ML: 529 INJECTION, SOLUTION INTRAVENOUS at 12:29

## 2024-07-09 DIAGNOSIS — R22.0 SCALP MASS: Primary | ICD-10-CM

## 2024-11-07 ENCOUNTER — OFFICE (AMBULATORY)
Age: 34
End: 2024-11-07

## 2024-11-07 ENCOUNTER — ON CAMPUS - OUTPATIENT (AMBULATORY)
Dept: URBAN - METROPOLITAN AREA HOSPITAL 2 | Facility: HOSPITAL | Age: 34
End: 2024-11-07

## 2024-11-07 ENCOUNTER — ON CAMPUS - OUTPATIENT (AMBULATORY)
Age: 34
End: 2024-11-07

## 2024-11-07 ENCOUNTER — OFFICE (AMBULATORY)
Dept: URBAN - METROPOLITAN AREA PATHOLOGY 19 | Facility: PATHOLOGY | Age: 34
End: 2024-11-07

## 2024-11-07 VITALS
WEIGHT: 169 LBS | OXYGEN SATURATION: 95 % | DIASTOLIC BLOOD PRESSURE: 66 MMHG | DIASTOLIC BLOOD PRESSURE: 86 MMHG | DIASTOLIC BLOOD PRESSURE: 66 MMHG | TEMPERATURE: 97.6 F | OXYGEN SATURATION: 100 % | OXYGEN SATURATION: 96 % | DIASTOLIC BLOOD PRESSURE: 64 MMHG | HEART RATE: 76 BPM | TEMPERATURE: 97.6 F | SYSTOLIC BLOOD PRESSURE: 110 MMHG | HEIGHT: 66 IN | DIASTOLIC BLOOD PRESSURE: 64 MMHG | RESPIRATION RATE: 20 BRPM | OXYGEN SATURATION: 95 % | HEART RATE: 77 BPM | DIASTOLIC BLOOD PRESSURE: 66 MMHG | OXYGEN SATURATION: 100 % | DIASTOLIC BLOOD PRESSURE: 97 MMHG | RESPIRATION RATE: 18 BRPM | HEART RATE: 77 BPM | OXYGEN SATURATION: 95 % | RESPIRATION RATE: 17 BRPM | HEART RATE: 76 BPM | OXYGEN SATURATION: 95 % | SYSTOLIC BLOOD PRESSURE: 147 MMHG | RESPIRATION RATE: 17 BRPM | SYSTOLIC BLOOD PRESSURE: 112 MMHG | RESPIRATION RATE: 17 BRPM | DIASTOLIC BLOOD PRESSURE: 64 MMHG | DIASTOLIC BLOOD PRESSURE: 64 MMHG | SYSTOLIC BLOOD PRESSURE: 103 MMHG | SYSTOLIC BLOOD PRESSURE: 128 MMHG | OXYGEN SATURATION: 100 % | WEIGHT: 169 LBS | SYSTOLIC BLOOD PRESSURE: 103 MMHG | HEART RATE: 86 BPM | DIASTOLIC BLOOD PRESSURE: 66 MMHG | DIASTOLIC BLOOD PRESSURE: 69 MMHG | HEART RATE: 79 BPM | OXYGEN SATURATION: 96 % | SYSTOLIC BLOOD PRESSURE: 112 MMHG | DIASTOLIC BLOOD PRESSURE: 64 MMHG | RESPIRATION RATE: 20 BRPM | OXYGEN SATURATION: 96 % | HEART RATE: 76 BPM | DIASTOLIC BLOOD PRESSURE: 69 MMHG | DIASTOLIC BLOOD PRESSURE: 97 MMHG | HEIGHT: 66 IN | OXYGEN SATURATION: 96 % | SYSTOLIC BLOOD PRESSURE: 128 MMHG | SYSTOLIC BLOOD PRESSURE: 110 MMHG | DIASTOLIC BLOOD PRESSURE: 69 MMHG | SYSTOLIC BLOOD PRESSURE: 147 MMHG | DIASTOLIC BLOOD PRESSURE: 66 MMHG | SYSTOLIC BLOOD PRESSURE: 128 MMHG | SYSTOLIC BLOOD PRESSURE: 103 MMHG | SYSTOLIC BLOOD PRESSURE: 110 MMHG | SYSTOLIC BLOOD PRESSURE: 103 MMHG | DIASTOLIC BLOOD PRESSURE: 86 MMHG | DIASTOLIC BLOOD PRESSURE: 86 MMHG | HEART RATE: 79 BPM | SYSTOLIC BLOOD PRESSURE: 112 MMHG | DIASTOLIC BLOOD PRESSURE: 69 MMHG | DIASTOLIC BLOOD PRESSURE: 97 MMHG | SYSTOLIC BLOOD PRESSURE: 112 MMHG | HEART RATE: 79 BPM | RESPIRATION RATE: 18 BRPM | RESPIRATION RATE: 20 BRPM | OXYGEN SATURATION: 96 % | SYSTOLIC BLOOD PRESSURE: 110 MMHG | RESPIRATION RATE: 17 BRPM | WEIGHT: 169 LBS | SYSTOLIC BLOOD PRESSURE: 147 MMHG | RESPIRATION RATE: 20 BRPM | OXYGEN SATURATION: 100 % | HEART RATE: 86 BPM | SYSTOLIC BLOOD PRESSURE: 112 MMHG | HEART RATE: 77 BPM | SYSTOLIC BLOOD PRESSURE: 147 MMHG | HEART RATE: 77 BPM | HEART RATE: 86 BPM | TEMPERATURE: 97.6 F | HEART RATE: 86 BPM | HEART RATE: 79 BPM | HEIGHT: 66 IN | HEART RATE: 76 BPM | SYSTOLIC BLOOD PRESSURE: 110 MMHG | SYSTOLIC BLOOD PRESSURE: 128 MMHG | HEIGHT: 66 IN | HEART RATE: 77 BPM | DIASTOLIC BLOOD PRESSURE: 66 MMHG | SYSTOLIC BLOOD PRESSURE: 103 MMHG | WEIGHT: 169 LBS | HEIGHT: 66 IN | SYSTOLIC BLOOD PRESSURE: 112 MMHG | SYSTOLIC BLOOD PRESSURE: 147 MMHG | DIASTOLIC BLOOD PRESSURE: 69 MMHG | DIASTOLIC BLOOD PRESSURE: 69 MMHG | WEIGHT: 169 LBS | RESPIRATION RATE: 20 BRPM | SYSTOLIC BLOOD PRESSURE: 147 MMHG | HEIGHT: 66 IN | DIASTOLIC BLOOD PRESSURE: 86 MMHG | TEMPERATURE: 97.6 F | DIASTOLIC BLOOD PRESSURE: 86 MMHG | SYSTOLIC BLOOD PRESSURE: 110 MMHG | HEART RATE: 79 BPM | RESPIRATION RATE: 20 BRPM | OXYGEN SATURATION: 100 % | RESPIRATION RATE: 18 BRPM | HEART RATE: 86 BPM | HEART RATE: 77 BPM | DIASTOLIC BLOOD PRESSURE: 64 MMHG | HEART RATE: 76 BPM | WEIGHT: 169 LBS | HEART RATE: 76 BPM | OXYGEN SATURATION: 96 % | DIASTOLIC BLOOD PRESSURE: 97 MMHG | WEIGHT: 169 LBS | HEART RATE: 86 BPM | OXYGEN SATURATION: 95 % | DIASTOLIC BLOOD PRESSURE: 69 MMHG | DIASTOLIC BLOOD PRESSURE: 86 MMHG | SYSTOLIC BLOOD PRESSURE: 112 MMHG | HEIGHT: 66 IN | OXYGEN SATURATION: 100 % | RESPIRATION RATE: 18 BRPM | RESPIRATION RATE: 18 BRPM | TEMPERATURE: 97.6 F | HEART RATE: 79 BPM | DIASTOLIC BLOOD PRESSURE: 97 MMHG | OXYGEN SATURATION: 95 % | RESPIRATION RATE: 17 BRPM | SYSTOLIC BLOOD PRESSURE: 103 MMHG | RESPIRATION RATE: 17 BRPM | RESPIRATION RATE: 18 BRPM | RESPIRATION RATE: 17 BRPM | HEART RATE: 79 BPM | SYSTOLIC BLOOD PRESSURE: 103 MMHG | SYSTOLIC BLOOD PRESSURE: 128 MMHG | SYSTOLIC BLOOD PRESSURE: 128 MMHG | DIASTOLIC BLOOD PRESSURE: 97 MMHG | DIASTOLIC BLOOD PRESSURE: 64 MMHG | RESPIRATION RATE: 18 BRPM | TEMPERATURE: 97.6 F | HEART RATE: 77 BPM | SYSTOLIC BLOOD PRESSURE: 147 MMHG | DIASTOLIC BLOOD PRESSURE: 86 MMHG | DIASTOLIC BLOOD PRESSURE: 97 MMHG | OXYGEN SATURATION: 100 % | TEMPERATURE: 97.6 F | OXYGEN SATURATION: 96 % | DIASTOLIC BLOOD PRESSURE: 66 MMHG | SYSTOLIC BLOOD PRESSURE: 128 MMHG | HEART RATE: 76 BPM | SYSTOLIC BLOOD PRESSURE: 110 MMHG | OXYGEN SATURATION: 95 % | RESPIRATION RATE: 20 BRPM | HEART RATE: 86 BPM

## 2024-11-07 DIAGNOSIS — K31.7 POLYP OF STOMACH AND DUODENUM: ICD-10-CM

## 2024-11-07 DIAGNOSIS — K22.70 BARRETT'S ESOPHAGUS WITHOUT DYSPLASIA: ICD-10-CM

## 2024-11-07 DIAGNOSIS — K22.2 ESOPHAGEAL OBSTRUCTION: ICD-10-CM

## 2024-11-07 LAB
GI HISTOLOGY: A. STOMACH FUNDUS: (no result)
GI HISTOLOGY: B. GASTRO-ESOPHAGEAL JUNCTION: (no result)
GI HISTOLOGY: PDF REPORT: (no result)

## 2024-11-07 PROCEDURE — 88305 TISSUE EXAM BY PATHOLOGIST: CPT | Performed by: PATHOLOGY

## 2024-11-07 PROCEDURE — 43239 EGD BIOPSY SINGLE/MULTIPLE: CPT | Performed by: INTERNAL MEDICINE

## 2024-11-20 ENCOUNTER — APPOINTMENT (OUTPATIENT)
Dept: CT IMAGING | Facility: HOSPITAL | Age: 34
End: 2024-11-20
Payer: COMMERCIAL

## 2024-11-20 ENCOUNTER — HOSPITAL ENCOUNTER (EMERGENCY)
Facility: HOSPITAL | Age: 34
Discharge: HOME | End: 2024-11-20
Admitting: EMERGENCY MEDICINE
Payer: COMMERCIAL

## 2024-11-20 VITALS
WEIGHT: 170 LBS | HEART RATE: 78 BPM | BODY MASS INDEX: 27.32 KG/M2 | HEIGHT: 66 IN | OXYGEN SATURATION: 98 % | TEMPERATURE: 98 F | RESPIRATION RATE: 15 BRPM | SYSTOLIC BLOOD PRESSURE: 111 MMHG | DIASTOLIC BLOOD PRESSURE: 78 MMHG

## 2024-11-20 DIAGNOSIS — R09.A0 SENSATION OF FOREIGN BODY: Primary | ICD-10-CM

## 2024-11-20 DIAGNOSIS — Z87.19 HISTORY OF BARRETT'S ESOPHAGUS: ICD-10-CM

## 2024-11-20 PROCEDURE — 99285 EMERGENCY DEPT VISIT HI MDM: CPT

## 2024-11-20 PROCEDURE — 70491 CT SOFT TISSUE NECK W/DYE: CPT

## 2024-11-20 PROCEDURE — 25510000001 IOPAMIDOL PER 1 ML

## 2024-11-20 RX ORDER — METHYLPREDNISOLONE 4 MG/1
TABLET ORAL
Qty: 21 TABLET | Refills: 0 | Status: SHIPPED | OUTPATIENT
Start: 2024-11-20

## 2024-11-20 RX ORDER — IOPAMIDOL 755 MG/ML
100 INJECTION, SOLUTION INTRAVASCULAR
Status: COMPLETED | OUTPATIENT
Start: 2024-11-20 | End: 2024-11-20

## 2024-11-20 RX ORDER — SODIUM CHLORIDE 0.9 % (FLUSH) 0.9 %
10 SYRINGE (ML) INJECTION AS NEEDED
Status: DISCONTINUED | OUTPATIENT
Start: 2024-11-20 | End: 2024-11-21 | Stop reason: HOSPADM

## 2024-11-20 RX ADMIN — IOPAMIDOL 100 ML: 755 INJECTION, SOLUTION INTRAVENOUS at 22:13

## 2024-11-20 NOTE — Clinical Note
[facility] EMERGENCY DEPARTMENT  [address] [place] [address]  Phone: [phone]    Girish Herzog was seen and treated in our emergency department on 11/20/2024.  He may return to work on 11/21/2024.         Thank you for choosing [facility].    Liz Andrade, APRN

## 2024-11-20 NOTE — Clinical Note
[facility] EMERGENCY DEPARTMENT  [address] [place] [address]  Phone: [phone]    Girish Herzog was seen and treated in our emergency department on 11/20/2024.  He may return to work on 11/21/2024.         Thank you for choosing [facility].    Liz Andrade, APRN

## 2024-11-21 ENCOUNTER — OFFICE VISIT (OUTPATIENT)
Dept: INTERNAL MEDICINE | Facility: CLINIC | Age: 34
End: 2024-11-21
Payer: COMMERCIAL

## 2024-11-21 VITALS
DIASTOLIC BLOOD PRESSURE: 84 MMHG | HEART RATE: 80 BPM | HEIGHT: 66 IN | WEIGHT: 170 LBS | OXYGEN SATURATION: 98 % | SYSTOLIC BLOOD PRESSURE: 120 MMHG | BODY MASS INDEX: 27.32 KG/M2

## 2024-11-21 DIAGNOSIS — R09.A2 GLOBUS SENSATION: ICD-10-CM

## 2024-11-21 DIAGNOSIS — R09.A0 SENSATION OF FOREIGN BODY: Primary | ICD-10-CM

## 2024-11-21 PROCEDURE — 99213 OFFICE O/P EST LOW 20 MIN: CPT | Performed by: NURSE PRACTITIONER

## 2024-11-21 NOTE — PROGRESS NOTES
Subjective   Girish Herzog is a 34 y.o. male. Patient is here today for   Chief Complaint   Patient presents with    Difficulty Swallowing          Vitals:    11/21/24 1400   BP: 120/84   Pulse: 80   SpO2: 98%     Body mass index is 27.45 kg/m².  The following portions of the patient's history were reviewed and updated as appropriate: allergies, current medications, past family history, past medical history, past social history, past surgical history and problem list.    Past Medical History:   Diagnosis Date    GERD (gastroesophageal reflux disease)       No Known Allergies   Social History     Socioeconomic History    Marital status: Single   Tobacco Use    Smoking status: Never    Smokeless tobacco: Never   Vaping Use    Vaping status: Never Used   Substance and Sexual Activity    Alcohol use: Not Currently     Comment: Social drinker    Drug use: Never    Sexual activity: Never        Current Outpatient Medications:     B Complex Vitamins (Vitamin-B Complex) tablet, Take 1 tablet by mouth Daily., Disp: , Rfl:     magnesium oxide (MAG-OX) 400 MG tablet, Take 1 tablet by mouth Daily., Disp: , Rfl:     methylPREDNISolone (MEDROL) 4 MG dose pack, Take as directed on package instructions., Disp: 21 tablet, Rfl: 0    pantoprazole (PROTONIX) 20 MG EC tablet, Take 1 tablet by mouth Daily., Disp: , Rfl:      History of Present Illness  Girish is a 34 year old male patient of Dr Gorman who is here for an ER follow up. He was seen in the ER at [facility] yesterday for a foreign body sensation in his neck. He states when he breaths it feels like something is stuck in his neck. He had an egd on 11/7/24 for jaeger's esophagus. He denies any injury. CT of the neck was done in the ER which showed no evidence of mass, normal epiglottis, normal lymph nodes. He passed the po challenge. He was discharged with a medrol dosepak to take if needed and advised to follow up with GI. He states he feels about the same. He denies shortness of  breath or difficulty swallowing   The following data was reviewed by: SWETHA Shipman on 11/21/2024:    Data reviewed : Radiologic studies ct neck and soft tissue, GI studies egd , and ER notes         Review of Systems   Constitutional:  Negative for fatigue.   HENT:  Negative for congestion, ear pain, postnasal drip, rhinorrhea, sore throat, trouble swallowing and voice change.         Feeling of foreign body in neck/throat    Respiratory:  Negative for chest tightness and shortness of breath.    Cardiovascular: Negative.    Skin:  Negative for rash.   Allergic/Immunologic: Negative for environmental allergies.       Objective     Physical Exam  Vitals and nursing note reviewed.   Constitutional:       General: He is not in acute distress.  HENT:      Mouth/Throat:      Pharynx: Oropharynx is clear.   Neck:      Thyroid: No thyromegaly.   Cardiovascular:      Rate and Rhythm: Normal rate and regular rhythm.      Heart sounds: Normal heart sounds.   Pulmonary:      Effort: Pulmonary effort is normal.      Breath sounds: Normal breath sounds.   Musculoskeletal:      Cervical back: Neck supple.   Skin:     General: Skin is warm and dry.   Neurological:      Mental Status: He is alert.         Assessment    ASSESSMENT    Diagnoses and all orders for this visit:    1. Sensation of foreign body (Primary)  -     Ambulatory Referral to ENT (Otolaryngology)    2. Globus sensation  -     Ambulatory Referral to ENT (Otolaryngology)          PLAN    Recommend that he follow up with GI  Will refer to ENT for further evaluation   Recommended increasing Pantoprazole to 40mg daily but he would like to discuss this with gi  Work note given for today  Follow up as needed and for continual care

## 2024-11-21 NOTE — ED PROVIDER NOTES
Subjective   History of Present Illness  Patient is a 34-year-old male with past medical history significant for Rainey's esophagus presenting with foreign body sensation in his throat.  He reports that he noticed it earlier today.  He has had this before, and he was worked up with an EGD and found to have Rainey's esophagus.  He had another EGD done on 11/7/2024 that showed rings in the esophagus that were not dilated.  He denies dysphagia but reports that he feels like something is moving in his neck when he swallows and when he breathes.  He denies shortness of breath, respiratory symptoms, nausea, vomiting, diarrhea.  He has not had any recent cough.  No fever or recent weight loss.  No hematochezia.        Review of Systems   Constitutional:  Negative for fatigue and fever.       Past Medical History:   Diagnosis Date    GERD (gastroesophageal reflux disease)        No Known Allergies    Past Surgical History:   Procedure Laterality Date    ENDOSCOPY         Family History   Problem Relation Age of Onset    Diabetes Father     Seizures Mother        Social History     Socioeconomic History    Marital status: Single   Tobacco Use    Smoking status: Never    Smokeless tobacco: Never   Vaping Use    Vaping status: Never Used   Substance and Sexual Activity    Alcohol use: Not Currently     Comment: Social drinker    Drug use: Never    Sexual activity: Never           Objective   Physical Exam  Constitutional:       General: He is not in acute distress.     Appearance: He is well-developed. He is not ill-appearing, toxic-appearing or diaphoretic.   HENT:      Head: Normocephalic and atraumatic.      Right Ear: Tympanic membrane and ear canal normal. No drainage, swelling or tenderness.      Left Ear: Tympanic membrane and ear canal normal. No drainage, swelling or tenderness.      Nose: No congestion or rhinorrhea.      Mouth/Throat:      Mouth: Mucous membranes are moist. No oral lesions.      Pharynx:  "Oropharynx is clear. Uvula midline. No pharyngeal swelling, oropharyngeal exudate, posterior oropharyngeal erythema or uvula swelling.   Eyes:      Extraocular Movements:      Right eye: Normal extraocular motion.      Left eye: Normal extraocular motion.      Conjunctiva/sclera: Conjunctivae normal.      Pupils: Pupils are equal, round, and reactive to light.   Neck:      Thyroid: No thyromegaly.   Cardiovascular:      Rate and Rhythm: Normal rate and regular rhythm.      Heart sounds: Normal heart sounds. No murmur heard.     No friction rub. No gallop.   Pulmonary:      Effort: Pulmonary effort is normal. No respiratory distress.      Breath sounds: Normal breath sounds. No stridor. No wheezing, rhonchi or rales.   Chest:      Chest wall: No tenderness.   Abdominal:      General: There is no distension.      Palpations: Abdomen is soft.      Tenderness: There is no abdominal tenderness.   Musculoskeletal:      Cervical back: Normal range of motion and neck supple.   Skin:     General: Skin is warm and dry.      Capillary Refill: Capillary refill takes less than 2 seconds.   Neurological:      General: No focal deficit present.      Mental Status: He is alert.   Psychiatric:         Mood and Affect: Mood normal.         Behavior: Behavior normal.         Procedures           ED Course  ED Course as of 11/20/24 2350   Wed Nov 20, 2024 2158 Pending for CT [DT]      ED Course User Index  [DT] Liz Andrade, SWETHA      /78 (BP Location: Left arm, Patient Position: Lying)   Pulse 78   Temp 98 °F (36.7 °C) (Oral)   Resp 15   Ht 167.6 cm (66\")   Wt 77.1 kg (170 lb)   SpO2 98%   BMI 27.44 kg/m²   Labs Reviewed - No data to display  Medications   sodium chloride 0.9 % flush 10 mL (has no administration in time range)   iopamidol (ISOVUE-370) 76 % injection 100 mL (100 mL Intravenous Given 11/20/24 2213)     .d1day                                                   Medical Decision Making  Patient is a " 34-year-old male presenting foreign body sensation in his anterior neck.  Chart review: He had an EGD on 11/7/2024 that showed Rainey's esophagus with an EG junction ring.  Patient is presented today with a sensation when he breathes it feels like something is in his neck.  CT of the soft tissue of the neck with contrast revealed no evidence of mass, normal epiglottis, normal lymph nodes.  Assessment, patient is resting comfortably in no acute distress.  He passed p.o. challenge.  Given patient's report, recent EGD, and lack of acute findings on the CT, patient was discharged with a steroid and instructed to return to his GI doctor for further evaluation.  He verbalized agreement and understanding.    Problems Addressed:  History of Rainey's esophagus: complicated acute illness or injury  Sensation of foreign body: complicated acute illness or injury    Amount and/or Complexity of Data Reviewed  Radiology: ordered.    Risk  Prescription drug management.        Final diagnoses:   Sensation of foreign body   History of Rainey's esophagus       ED Disposition  ED Disposition       ED Disposition   Discharge    Condition   Stable    Comment   --               Elia Gorman MD  [address] [address]  [phone]    Call   As needed    Your GI doctor               Medication List        New Prescriptions      methylPREDNISolone 4 MG dose pack  Commonly known as: MEDROL  Take as directed on package instructions.               Where to Get Your Medications        These medications were sent to [facility] [id number] - [place], [place] - [address] - [phone]  - [phone] FX  [address] [place] [address]      Phone: [phone]   methylPREDNISolone 4 MG dose pack            Liz Andrade, APRN  11/20/24 0516

## 2024-11-21 NOTE — DISCHARGE INSTRUCTIONS
Follow-up with PCP and GI as discussed.  Take medications as prescribed.  Continue regular home medications.  Return to the ER with new or worsening symptoms.

## 2024-11-21 NOTE — LETTER
November 22, 2024     Patient: Girish Herzog   YOB: 1990   Date of Visit: 11/21/2024       To Whom It May Concern:    Girish was seen in my office for evaluation today. He can return to work .          Sincerely,        SWETHA Shipman    CC: No Recipients

## 2024-12-27 ENCOUNTER — OFFICE VISIT (OUTPATIENT)
Dept: INTERNAL MEDICINE | Facility: CLINIC | Age: 34
End: 2024-12-27
Payer: COMMERCIAL

## 2024-12-27 VITALS
TEMPERATURE: 98.8 F | SYSTOLIC BLOOD PRESSURE: 120 MMHG | OXYGEN SATURATION: 98 % | BODY MASS INDEX: 24.85 KG/M2 | WEIGHT: 167.8 LBS | HEART RATE: 80 BPM | HEIGHT: 69 IN | DIASTOLIC BLOOD PRESSURE: 77 MMHG | RESPIRATION RATE: 10 BRPM

## 2024-12-27 DIAGNOSIS — R40.4 NONSPECIFIC PAROXYSMAL SPELL: ICD-10-CM

## 2024-12-27 DIAGNOSIS — Z00.00 ANNUAL PHYSICAL EXAM: Primary | ICD-10-CM

## 2024-12-27 DIAGNOSIS — R09.A0 SENSATION OF FOREIGN BODY: ICD-10-CM

## 2024-12-27 LAB
ALBUMIN SERPL-MCNC: 4.5 G/DL (ref 3.5–5.2)
ALBUMIN/GLOB SERPL: 1.5 G/DL
ALP SERPL-CCNC: 88 U/L (ref 39–117)
ALT SERPL-CCNC: 67 U/L (ref 1–41)
AST SERPL-CCNC: 34 U/L (ref 1–40)
BASOPHILS # BLD AUTO: 0.06 10*3/MM3 (ref 0–0.2)
BASOPHILS NFR BLD AUTO: 0.8 % (ref 0–1.5)
BILIRUB SERPL-MCNC: 0.6 MG/DL (ref 0–1.2)
BUN SERPL-MCNC: 10 MG/DL (ref 6–20)
BUN/CREAT SERPL: 11.1 (ref 7–25)
CALCIUM SERPL-MCNC: 9.9 MG/DL (ref 8.6–10.5)
CHLORIDE SERPL-SCNC: 104 MMOL/L (ref 98–107)
CHOLEST SERPL-MCNC: 195 MG/DL (ref 0–200)
CO2 SERPL-SCNC: 26.6 MMOL/L (ref 22–29)
CREAT SERPL-MCNC: 0.9 MG/DL (ref 0.76–1.27)
EGFRCR SERPLBLD CKD-EPI 2021: 113.5 ML/MIN/1.73
EOSINOPHIL # BLD AUTO: 0.16 10*3/MM3 (ref 0–0.4)
EOSINOPHIL NFR BLD AUTO: 2.1 % (ref 0.3–6.2)
ERYTHROCYTE [DISTWIDTH] IN BLOOD BY AUTOMATED COUNT: 12.9 % (ref 12.3–15.4)
GLOBULIN SER CALC-MCNC: 3.1 GM/DL
GLUCOSE SERPL-MCNC: 92 MG/DL (ref 65–99)
HBA1C MFR BLD: 5 % (ref 4.8–5.6)
HCT VFR BLD AUTO: 49.1 % (ref 37.5–51)
HDLC SERPL-MCNC: 48 MG/DL (ref 40–60)
HGB BLD-MCNC: 17.1 G/DL (ref 13–17.7)
IMM GRANULOCYTES # BLD AUTO: 0.02 10*3/MM3 (ref 0–0.05)
IMM GRANULOCYTES NFR BLD AUTO: 0.3 % (ref 0–0.5)
LDLC SERPL CALC-MCNC: 125 MG/DL (ref 0–100)
LYMPHOCYTES # BLD AUTO: 2.45 10*3/MM3 (ref 0.7–3.1)
LYMPHOCYTES NFR BLD AUTO: 31.5 % (ref 19.6–45.3)
MCH RBC QN AUTO: 31.1 PG (ref 26.6–33)
MCHC RBC AUTO-ENTMCNC: 34.8 G/DL (ref 31.5–35.7)
MCV RBC AUTO: 89.3 FL (ref 79–97)
MONOCYTES # BLD AUTO: 0.79 10*3/MM3 (ref 0.1–0.9)
MONOCYTES NFR BLD AUTO: 10.2 % (ref 5–12)
NEUTROPHILS # BLD AUTO: 4.3 10*3/MM3 (ref 1.7–7)
NEUTROPHILS NFR BLD AUTO: 55.1 % (ref 42.7–76)
NRBC BLD AUTO-RTO: 0 /100 WBC (ref 0–0.2)
PLATELET # BLD AUTO: 355 10*3/MM3 (ref 140–450)
POTASSIUM SERPL-SCNC: 4.6 MMOL/L (ref 3.5–5.2)
PROT SERPL-MCNC: 7.6 G/DL (ref 6–8.5)
RBC # BLD AUTO: 5.5 10*6/MM3 (ref 4.14–5.8)
SODIUM SERPL-SCNC: 139 MMOL/L (ref 136–145)
TRIGL SERPL-MCNC: 122 MG/DL (ref 0–150)
TSH SERPL DL<=0.005 MIU/L-ACNC: 0.94 UIU/ML (ref 0.27–4.2)
VLDLC SERPL CALC-MCNC: 22 MG/DL (ref 5–40)
WBC # BLD AUTO: 7.78 10*3/MM3 (ref 3.4–10.8)

## 2024-12-27 PROCEDURE — 99395 PREV VISIT EST AGE 18-39: CPT | Performed by: STUDENT IN AN ORGANIZED HEALTH CARE EDUCATION/TRAINING PROGRAM

## 2024-12-27 NOTE — PROGRESS NOTES
Elia Gorman M.D.  Internal Medicine  [facility]  [address], [address]  [place] [address]  [phone]      Chief Complaint  Annual Exam (Physical /Pt, C/o still feeling like there is something in nasal and throat area, Sx. Has not got worse or  better)    SUBJECTIVE    History of Present Illness    Girish Herzog is a 34 y.o. male with Rainey's esophagus who presents to the office today as an established patient that last saw me on 12/20/2023.     Follows with neurology for nonspecific paroxysmal spells.  These have improved since he has started supplementation with vitamin D and magnesium.  He had prolonged EEG which was normal and brain MRI which did not show acute intracranial abnormalities but did show hyperintensity in the left frontal/temporal muscle.  He has follow-up MRI pending.  He is following with cardiology as needed for chest pain.  He had a normal echo. Last episode was in November with slight weakness in legs. Episodes are less frequent. Random pains decreased in frequency.     He had emergency department visit in November for foreign body sensation in his neck and sinus area.  He had CT of the neck which was essentially normal. He had an EGD on 11/7/2024 that showed Rainey's esophagus with an EG junction ring.  Patient was discharged with a steroid and advised to return to GI.  He followed up in our office and was advised to be evaluated by ENT as well and increase his Protonix to 40 mg daily. Patient states he saw Dr. Moon and no abnormal findings were visualized.     Concerned about some moles on his abdomen that are not changing or growing.     Walks for exercise.     Review of Systems    No Known Allergies     Outpatient Medications Marked as Taking for the 12/27/24 encounter (Office Visit) with Elia Gorman MD   Medication Sig Dispense Refill    B Complex Vitamins (Vitamin-B Complex) tablet Take 1 tablet by mouth Daily.      magnesium oxide (MAG-OX) 400 MG  "tablet Take 1 tablet by mouth Daily.      pantoprazole (PROTONIX) 20 MG EC tablet Take 1 tablet by mouth Daily.      [DISCONTINUED] methylPREDNISolone (MEDROL) 4 MG dose pack Take as directed on package instructions. 21 tablet 0        Past Medical History:   Diagnosis Date    GERD (gastroesophageal reflux disease)      Past Surgical History:   Procedure Laterality Date    ENDOSCOPY       Family History   Problem Relation Age of Onset    Diabetes Father     Seizures Mother     reports that he has never smoked. He has never used smokeless tobacco. He reports that he does not currently use alcohol. He reports that he does not use drugs.    OBJECTIVE    Vital Signs:   /77 (BP Location: Right arm, Patient Position: Sitting, Cuff Size: Adult)   Pulse 80   Temp 98.8 °F (37.1 °C) (Oral)   Resp 10   Ht 175.3 cm (69\")   Wt 76.1 kg (167 lb 12.8 oz)   SpO2 98%   BMI 24.78 kg/m²     Physical Exam  Constitutional:       Appearance: Normal appearance.   Cardiovascular:      Rate and Rhythm: Normal rate and regular rhythm.      Heart sounds: Normal heart sounds. No murmur heard.  Pulmonary:      Effort: Pulmonary effort is normal.      Breath sounds: Normal breath sounds.   Abdominal:      General: Abdomen is flat. There is no distension.      Palpations: Abdomen is soft.      Tenderness: There is no abdominal tenderness.   Skin:     General: Skin is warm and dry.      Comments: Three Brown moles on abdomen   Neurological:      Mental Status: He is alert.   Psychiatric:         Mood and Affect: Mood normal.         Behavior: Behavior normal.         Thought Content: Thought content normal.            The following data was reviewed by: Elia Gorman MD on 12/27/2024:  CMP          12/27/2024    10:34   CMP   Glucose 92    BUN 10    Creatinine 0.90    Sodium 139    Potassium 4.6    Chloride 104    Calcium 9.9    Total Protein 7.6    Albumin 4.5    Globulin 3.1    Total Bilirubin 0.6    Alkaline Phosphatase 88    AST " (SGOT) 34    ALT (SGPT) 67    BUN/Creatinine Ratio 11.1        Lipid Panel          12/27/2024    10:34   Lipid Panel   Total Cholesterol 195    Triglycerides 122    HDL Cholesterol 48    VLDL Cholesterol 22    LDL Cholesterol  125      TSH          12/27/2024    10:34   TSH   TSH 0.943      A1C Last 3 Results          12/27/2024    10:34   HGBA1C Last 3 Results   Hemoglobin A1C 5.00      Data reviewed : Recent MRI and ED note  . ENT not not available            ASSESSMENT & PLAN        Annual physical exam  Annual Preventative Health Examination  -Age and sex appropriate physical exam performed and documented. Updated past medical, family, social and surgical histories as well as allergies and care team list. Addressed care gaps listed in the medical record.  -Encouraged annual dental and vision exams as part of their overall health.  -Encouraged minimum of 30 minutes or more of exercise at a brisk walk or higher 5 days per week combined with a well-balanced diet.   -Immunizations reviewed and updated in EMR.   -Lipid screening:    Will screen for hyperlipidemia today and calculate ASCVD risk if appropriate.    -Aspirin for primary or secondary prevention: Not applicable, patient is less than age 50.  -Depression screening: PHQ2 performed and the patient's screen was negative.  -Diabetes screening: screening today  -Abdominal aortic aneurysm screening: AAA screening ultrasound is not indicated as patient has never smoked.  -Hypertension screening: Patient screened negative for HTN today.  -Colon cancer screening: Patient is less than age 45 and colon cancer screening is not indicated.  -Lung cancer screening: Patient has never smoked.  -Prostate cancer screening: Not applicable, patient is less than 50 years old.  Orders:    Comprehensive Metabolic Panel    CBC & Differential    Lipid Panel    TSH Rfx On Abnormal To Free T4    Hemoglobin A1c    Sensation of foreign body  Still bothersome to patient. Work up normal  so far. Recommend GI follow up.        Nonspecific paroxysmal spell  Improving with over the counter supplements               Health Maintenance Due   Topic Date Due    ANNUAL PHYSICAL  12/20/2024        Follow Up  Return in about 1 year (around 12/27/2025) for Annual physical.    Patient/family had no further questions at this time and verbalized understanding of the plan discussed today.

## 2024-12-31 DIAGNOSIS — R74.01 ELEVATED ALT MEASUREMENT: Primary | ICD-10-CM

## 2025-01-03 LAB
ALBUMIN SERPL-MCNC: 4.3 G/DL (ref 4.1–5.1)
ALP SERPL-CCNC: 81 IU/L (ref 44–121)
ALT SERPL-CCNC: 39 IU/L (ref 0–44)
AST SERPL-CCNC: 22 IU/L (ref 0–40)
BILIRUB SERPL-MCNC: 0.6 MG/DL (ref 0–1.2)
BUN SERPL-MCNC: 11 MG/DL (ref 6–20)
BUN/CREAT SERPL: 14 (ref 9–20)
CALCIUM SERPL-MCNC: 9.3 MG/DL (ref 8.7–10.2)
CHLORIDE SERPL-SCNC: 105 MMOL/L (ref 96–106)
CO2 SERPL-SCNC: 24 MMOL/L (ref 20–29)
CREAT SERPL-MCNC: 0.81 MG/DL (ref 0.76–1.27)
EGFRCR SERPLBLD CKD-EPI 2021: 117 ML/MIN/1.73
FERRITIN SERPL-MCNC: 219 NG/ML (ref 30–400)
GLOBULIN SER CALC-MCNC: 2.4 G/DL (ref 1.5–4.5)
GLUCOSE SERPL-MCNC: 88 MG/DL (ref 70–99)
HBV CORE AB SERPL QL IA: NEGATIVE
HBV SURFACE AB SER QL: REACTIVE
HCV AB SERPL QL IA: NORMAL
HCV IGG SERPL QL IA: NON REACTIVE
IRON SATN MFR SERPL: 34 % (ref 15–55)
IRON SERPL-MCNC: 93 UG/DL (ref 38–169)
POTASSIUM SERPL-SCNC: 4.4 MMOL/L (ref 3.5–5.2)
PROT SERPL-MCNC: 6.7 G/DL (ref 6–8.5)
SODIUM SERPL-SCNC: 142 MMOL/L (ref 134–144)
TIBC SERPL-MCNC: 272 UG/DL (ref 250–450)
UIBC SERPL-MCNC: 179 UG/DL (ref 111–343)

## 2025-05-06 ENCOUNTER — OFFICE VISIT (OUTPATIENT)
Dept: NEUROLOGY | Facility: CLINIC | Age: 35
End: 2025-05-06
Payer: COMMERCIAL

## 2025-05-06 VITALS
WEIGHT: 174 LBS | SYSTOLIC BLOOD PRESSURE: 142 MMHG | BODY MASS INDEX: 25.77 KG/M2 | DIASTOLIC BLOOD PRESSURE: 82 MMHG | HEART RATE: 82 BPM | OXYGEN SATURATION: 98 % | HEIGHT: 69 IN

## 2025-05-06 DIAGNOSIS — R40.4 NONSPECIFIC PAROXYSMAL SPELL: Primary | ICD-10-CM

## 2025-05-06 PROCEDURE — 99213 OFFICE O/P EST LOW 20 MIN: CPT | Performed by: PSYCHIATRY & NEUROLOGY

## 2025-05-06 NOTE — ASSESSMENT & PLAN NOTE
35 year old right handed man who returns to neurology clinic for follow up evaluation and treatment of a spells and follow up of normal prolonged EEG.  He reports that around 12/10/2022 he was at work outside and he felt the need to take a deep breath and felt like there was a weakness in the knees (he did not think he was actually going to fall but had a sensation like he was week in his knees) which lasted less than 30 minutes.  The second time this spell happened was on 12/14/2022 and the same thing he felt like he needed to take a breath and had random pains in his arms and legs and chest along with the same weakness sensation that lasted around the same time and resolved.  With the second spell he was evaluated in the ED.  He had lab work that looked normal.  He had another episodes on 1/19/2023 which did not last as long.  He had another one on the 14th of February in 2023.  He had an episode on 2/17/2023.  All of the spells are similar to each other and appear stereotypical but also he has been getting random pains as well.  Of note he had a brain MRI scan on 1/28/2023 which does not demonstrate any acute intracranial abnormalities but does demonstrate hyperintensity in the left frontal/temporal musculature for which a repeat brain MRI scan was repeated on 4/18/2023 which again demonstrated the hyperintensity in the left frontal/temporal musculature with some possible hemangiomas and follow up MRI which he will have done in the future and again a repeat brain MRI in 7/2024 with similar findings and another MRI in a year time was recommended for follow up which has been scheduled by her PCP for 7/9/2025.  He denies history of migraines but tells me he has the occasional headaches that are not severe or debilitating without any light or sound sensitivity or nausea.  He reports a normal birth history and no seizures that he is aware of in childhood.  He has not gotten weak to the point of falling and he tells me  today he is not really weak he just feels the sensation of possible weakness.  He has not noticed any association of these spells with eating or not eating.  He denies feeling sleepy during these spells.  He also denies any increased anxiety or stress in his personal life when these spells started.  He had a 1 hour and 1 minute prolonged awake and sleep EEG which was normal in 2023.  He previously had a spell on 2/29/2024 and tells me the spells have been happening a lot less, again in 4/10/2024 with multiple pains randomly, 6/11/2024 weakness in legs and felt off, 7/9/2024 with odd feeling in legs and his heart rate was 41, 7/25/2024 odd feelings and random pain, and 11/14/2024 with slight weakness, and most recently was 3/10/2025 with odd feeling in left leg below knee lasted few seconds.  He has never injured himself with these spells and he tells me he never thought he was going to fall just felt slightly off.  He has normal TSH and folate and Vit B12 was on the lower side of normal which has been supplemented.  He thinks his symptoms have improved some since taking the Vit B12 and magnesium oxide.  I reviewed his most recent labs from 1/2025 which are normal.  The random pains have also improved since starting the magnesium oxide.  He would like to hold off on further treatment since overall he seems to be doing better and he is not certain if they are stress related.  He does not experience loss of awareness with these spells.  He has been evaluated by cardiology with Holter monitoring which did not demonstrate any significant findings.  He has not noticed any triggers.  I spoke with him about keeping track of these including potential triggers and stress.  He does feel they are improving with magnesium oxide and Vit B.  We will hold off on further testing or second opinion at this time after discussing with patient and he would like to hold off at this time and be evaluated in 6 months with further evaluation  and sooner evaluation if they increase.

## 2025-05-06 NOTE — PROGRESS NOTES
Chief Complaint  Nonspecific paroxysmal spell (No new episodes since last visit)    Subjective          Girish Herzog presents to [facility] NEUROLOGY for   HISTORY OF PRESENT ILLNESS:    Girish Herzog is a 35 year old right handed man who returns to neurology clinic for follow up evaluation and treatment of a spells and follow up of normal prolonged EEG.  He reports that around 12/10/2022 he was at work outside and he felt the need to take a deep breath and felt like there was a weakness in the knees (he did not think he was actually going to fall but had a sensation like he was week in his knees) which lasted less than 30 minutes.  The second time this spell happened was on 12/14/2022 and the same thing he felt like he needed to take a breath and had random pains in his arms and legs and chest along with the same weakness sensation that lasted around the same time and resolved.  With the second spell he was evaluated in the ED.  He had lab work that looked normal.  He had another episodes on 1/19/2023 which did not last as long.  He had another one on the 14th of February in 2023.  He had an episode on 2/17/2023.  All of the spells are similar to each other and appear stereotypical but also he has been getting random pains as well.  Of note he had a brain MRI scan on 1/28/2023 which does not demonstrate any acute intracranial abnormalities but does demonstrate hyperintensity in the left frontal/temporal musculature for which a repeat brain MRI scan was repeated on 4/18/2023 which again demonstrated the hyperintensity in the left frontal/temporal musculature with some possible hemangiomas and follow up MRI which he will have done in the future and again a repeat brain MRI in 7/2024 with similar findings and another MRI in a year time was recommended for follow up which has been scheduled by her PCP for 7/9/2025.  He denies history of migraines but tells me he has the occasional headaches that  are not severe or debilitating without any light or sound sensitivity or nausea.  He reports a normal birth history and no seizures that he is aware of in childhood.  He has not gotten weak to the point of falling and he tells me today he is not really weak he just feels the sensation of possible weakness.  He has not noticed any association of these spells with eating or not eating.  He denies feeling sleepy during these spells.  He also denies any increased anxiety or stress in his personal life when these spells started.  He had a 1 hour and 1 minute prolonged awake and sleep EEG which was normal in 2023.  He previously had a spell on 2/29/2024 and tells me the spells have been happening a lot less, again in 4/10/2024 with multiple pains randomly, 6/11/2024 weakness in legs and felt off, 7/9/2024 with odd feeling in legs and his heart rate was 41, 7/25/2024 odd feelings and random pain, and 11/14/2024 with slight weakness, and most recently was 3/10/2025 with odd feeling in left leg below knee lasted few seconds.  He has never injured himself with these spells and he tells me he never thought he was going to fall just felt slightly off.  He has normal TSH and folate and Vit B12 was on the lower side of normal which has been supplemented.  He thinks his symptoms have improved some since taking the Vit B12 and magnesium oxide.  I reviewed his most recent labs from 1/2025 which are normal.  The random pains have also improved since starting the magnesium oxide.  He would like to hold off on further treatment since overall he seems to be doing better and he is not certain if they are stress related.  He does not experience loss of awareness with these spells.  He has been evaluated by cardiology with Holter monitoring which did not demonstrate any significant findings.  He has not noticed any triggers.      Past Medical History:   Diagnosis Date    GERD (gastroesophageal reflux disease)         Family History  "  Problem Relation Age of Onset    Diabetes Father     Seizures Mother         Social History     Socioeconomic History    Marital status: Single   Tobacco Use    Smoking status: Never    Smokeless tobacco: Never   Vaping Use    Vaping status: Never Used   Substance and Sexual Activity    Alcohol use: Not Currently     Comment: Social drinker    Drug use: Never    Sexual activity: Never        I have reviewed and confirmed the accuracy of the ROS as documented by the MA/LPN/RN Nakita Bush MD   Review of Systems   Neurological:  Positive for weakness. Negative for dizziness, tremors, seizures, syncope, facial asymmetry, speech difficulty, light-headedness, numbness, headache, memory problem and confusion.   Psychiatric/Behavioral:  Negative for agitation, behavioral problems, decreased concentration, dysphoric mood, hallucinations, self-injury, sleep disturbance, suicidal ideas, negative for hyperactivity, depressed mood and stress. The patient is not nervous/anxious.    He does not report weakness but more like odd sensations.      Objective   Vital Signs:   /82   Pulse 82   Ht 175.3 cm (69.02\")   Wt 78.9 kg (174 lb)   SpO2 98%   BMI 25.68 kg/m²       PHYSICAL EXAM:    General   Mental Status - Alert. General Appearance - Well developed, Well groomed, Oriented and Cooperative. Orientation - Oriented X3.       Head and Neck  Head - normocephalic, atraumatic with no lesions or palpable masses.  Neck    Global Assessment - supple.       Eye   Sclera/Conjunctiva - Bilateral - Normal.    ENMT  Mouth and Throat   Oral Cavity/Oropharynx: Oropharynx - the soft palate,uvula and tongue are normal in appearance.    Chest and Lung Exam   Chest - lung clear to auscultation bilaterally.    Cardiovascular   Cardiovascular examination reveals  - normal heart sounds, regular rate and rhythm.    Neurologic   Mental Status: Speech - Normal. Cognitive function - appropriate fund of knowledge. No impairment of attention, " Impairment of concentration, impairment of long term memory or impairment of short term memory.  Cranial Nerves:   II Optic: Visual acuity - Left - Normal. Right - Normal. Visual fields - Normal (to confrontation).  III Oculomotor: Pupillary constriction - Left - Normal. Right - Normal.  VII Facial: - Normal Bilaterally.   IX Glossopharyngeal / X Vagus - Normal.  XI Accessory: Trapezius - Bilateral - Normal. Sternocleidomastoid - Bilateral - Normal.  XII Hypoglossal - Bilateral - Normal.  Eye Movements: - Normal Bilaterally.  Sensory:   Light Touch: Intact - Globally.  Motor:   Bulk and Contour: - Normal.  Tone: - Normal.  Tremor: Not present.  Strength: 5/5 normal muscle strength - All Muscles.   General Assessment of Reflexes: - deep tendon reflexes are normal. Coordination - No Impairment of finger-to-nose or Impairment of rapid alternating movements. Gait - Normal.       Result Review :                 Assessment and Plan    Problem List Items Addressed This Visit       Nonspecific paroxysmal spell - Primary    Current Assessment & Plan   35 year old right handed man who returns to neurology clinic for follow up evaluation and treatment of a spells and follow up of normal prolonged EEG.  He reports that around 12/10/2022 he was at work outside and he felt the need to take a deep breath and felt like there was a weakness in the knees (he did not think he was actually going to fall but had a sensation like he was week in his knees) which lasted less than 30 minutes.  The second time this spell happened was on 12/14/2022 and the same thing he felt like he needed to take a breath and had random pains in his arms and legs and chest along with the same weakness sensation that lasted around the same time and resolved.  With the second spell he was evaluated in the ED.  He had lab work that looked normal.  He had another episodes on 1/19/2023 which did not last as long.  He had another one on the 14th of February in  2023.  He had an episode on 2/17/2023.  All of the spells are similar to each other and appear stereotypical but also he has been getting random pains as well.  Of note he had a brain MRI scan on 1/28/2023 which does not demonstrate any acute intracranial abnormalities but does demonstrate hyperintensity in the left frontal/temporal musculature for which a repeat brain MRI scan was repeated on 4/18/2023 which again demonstrated the hyperintensity in the left frontal/temporal musculature with some possible hemangiomas and follow up MRI which he will have done in the future and again a repeat brain MRI in 7/2024 with similar findings and another MRI in a year time was recommended for follow up which has been scheduled by her PCP for 7/9/2025.  He denies history of migraines but tells me he has the occasional headaches that are not severe or debilitating without any light or sound sensitivity or nausea.  He reports a normal birth history and no seizures that he is aware of in childhood.  He has not gotten weak to the point of falling and he tells me today he is not really weak he just feels the sensation of possible weakness.  He has not noticed any association of these spells with eating or not eating.  He denies feeling sleepy during these spells.  He also denies any increased anxiety or stress in his personal life when these spells started.  He had a 1 hour and 1 minute prolonged awake and sleep EEG which was normal in 2023.  He previously had a spell on 2/29/2024 and tells me the spells have been happening a lot less, again in 4/10/2024 with multiple pains randomly, 6/11/2024 weakness in legs and felt off, 7/9/2024 with odd feeling in legs and his heart rate was 41, 7/25/2024 odd feelings and random pain, and 11/14/2024 with slight weakness, and most recently was 3/10/2025 with odd feeling in left leg below knee lasted few seconds.  He has never injured himself with these spells and he tells me he never thought he  was going to fall just felt slightly off.  He has normal TSH and folate and Vit B12 was on the lower side of normal which has been supplemented.  He thinks his symptoms have improved some since taking the Vit B12 and magnesium oxide.  I reviewed his most recent labs from 1/2025 which are normal.  The random pains have also improved since starting the magnesium oxide.  He would like to hold off on further treatment since overall he seems to be doing better and he is not certain if they are stress related.  He does not experience loss of awareness with these spells.  He has been evaluated by cardiology with Holter monitoring which did not demonstrate any significant findings.  He has not noticed any triggers.  I spoke with him about keeping track of these including potential triggers and stress.  He does feel they are improving with magnesium oxide and Vit B.  We will hold off on further testing or second opinion at this time after discussing with patient and he would like to hold off at this time and be evaluated in 6 months with further evaluation and sooner evaluation if they increase.             I spent 27 minutes caring for Girish on this date of service. This time includes time spent by me in the following activities:preparing for the visit, reviewing tests, obtaining and/or reviewing a separately obtained history, performing a medically appropriate examination and/or evaluation , counseling and educating the patient/family/caregiver, documenting information in the medical record, independently interpreting results and communicating that information with the patient/family/caregiver, and care coordination    Follow Up   Return in about 6 months (around 11/6/2025).  Patient was given instructions and counseling regarding his condition or for health maintenance advice. Please see specific information pulled into the AVS if appropriate.

## 2025-07-09 ENCOUNTER — HOSPITAL ENCOUNTER (OUTPATIENT)
Dept: MRI IMAGING | Facility: HOSPITAL | Age: 35
Discharge: HOME | End: 2025-07-09
Admitting: STUDENT IN AN ORGANIZED HEALTH CARE EDUCATION/TRAINING PROGRAM
Payer: COMMERCIAL

## 2025-07-09 DIAGNOSIS — R22.0 SCALP MASS: ICD-10-CM

## 2025-07-09 PROCEDURE — 25510000002 GADOBENATE DIMEGLUMINE 529 MG/ML SOLUTION: Performed by: STUDENT IN AN ORGANIZED HEALTH CARE EDUCATION/TRAINING PROGRAM

## 2025-07-09 PROCEDURE — A9577 INJ MULTIHANCE: HCPCS | Performed by: STUDENT IN AN ORGANIZED HEALTH CARE EDUCATION/TRAINING PROGRAM

## 2025-07-09 PROCEDURE — 70553 MRI BRAIN STEM W/O & W/DYE: CPT

## 2025-07-09 RX ADMIN — GADOBENATE DIMEGLUMINE 15 ML: 529 INJECTION, SOLUTION INTRAVENOUS at 16:21

## 2025-07-11 ENCOUNTER — RESULTS FOLLOW-UP (OUTPATIENT)
Dept: INTERNAL MEDICINE | Facility: CLINIC | Age: 35
End: 2025-07-11
Payer: COMMERCIAL

## 2025-07-11 DIAGNOSIS — R22.0 SCALP MASS: Primary | ICD-10-CM

## 2025-07-11 NOTE — TELEPHONE ENCOUNTER
I called Girish Herzog at [phone] at 13:40 EDT     There was no answer so I left message that temporalis muscle lesion shows a small amount of growth.  Favored to be benign.  Continue to monitor with yearly MRI.